# Patient Record
Sex: FEMALE | Race: WHITE | NOT HISPANIC OR LATINO | Employment: OTHER | URBAN - METROPOLITAN AREA
[De-identification: names, ages, dates, MRNs, and addresses within clinical notes are randomized per-mention and may not be internally consistent; named-entity substitution may affect disease eponyms.]

---

## 2017-05-30 ENCOUNTER — CONVERSION ENCOUNTER (OUTPATIENT)
Dept: MAMMOGRAPHY | Facility: HOSPITAL | Age: 62
End: 2017-05-30

## 2018-06-27 ENCOUNTER — CONVERSION ENCOUNTER (OUTPATIENT)
Dept: MAMMOGRAPHY | Facility: HOSPITAL | Age: 63
End: 2018-06-27

## 2018-08-09 ENCOUNTER — CONVERSION ENCOUNTER (OUTPATIENT)
Dept: MAMMOGRAPHY | Facility: HOSPITAL | Age: 63
End: 2018-08-09

## 2018-08-15 ENCOUNTER — OFFICE VISIT CONVERTED (OUTPATIENT)
Dept: GASTROENTEROLOGY | Facility: CLINIC | Age: 63
End: 2018-08-15
Attending: NURSE PRACTITIONER

## 2019-02-18 ENCOUNTER — CONVERSION ENCOUNTER (OUTPATIENT)
Dept: GASTROENTEROLOGY | Facility: CLINIC | Age: 64
End: 2019-02-18

## 2019-02-18 ENCOUNTER — OFFICE VISIT CONVERTED (OUTPATIENT)
Dept: GASTROENTEROLOGY | Facility: CLINIC | Age: 64
End: 2019-02-18
Attending: NURSE PRACTITIONER

## 2019-02-25 ENCOUNTER — HOSPITAL ENCOUNTER (OUTPATIENT)
Dept: LAB | Facility: HOSPITAL | Age: 64
Discharge: HOME | End: 2019-02-25
Attending: NURSE PRACTITIONER

## 2019-02-25 ENCOUNTER — HOSPITAL ENCOUNTER (OUTPATIENT)
Dept: GENERAL RADIOLOGY | Facility: HOSPITAL | Age: 64
Discharge: HOME | End: 2019-02-25
Attending: NURSE PRACTITIONER

## 2019-02-25 LAB
BASOPHILS # BLD AUTO: 0.03 10*3/UL (ref 0–0.2)
BASOPHILS NFR BLD AUTO: 0.6 % (ref 0–3)
CONV ABS IMM GRAN: 0 10*3/UL (ref 0–0.2)
CONV IMMATURE GRAN: 0 % (ref 0–1.8)
DEPRECATED RDW RBC AUTO: 49 FL (ref 36.4–46.3)
EOSINOPHIL # BLD AUTO: 0.13 10*3/UL (ref 0–0.7)
EOSINOPHIL # BLD AUTO: 2.5 % (ref 0–7)
ERYTHROCYTE [DISTWIDTH] IN BLOOD BY AUTOMATED COUNT: 15.2 % (ref 11.7–14.4)
FERRITIN SERPL-MCNC: 15 NG/ML (ref 10–200)
HBA1C MFR BLD: 12.2 G/DL (ref 12–16)
HCT VFR BLD AUTO: 40.1 % (ref 37–47)
INR PPP: 0.96 (ref 2–3)
LYMPHOCYTES # BLD AUTO: 2.04 10*3/UL (ref 1–5)
MCH RBC QN AUTO: 26.6 PG (ref 27–31)
MCHC RBC AUTO-ENTMCNC: 30.4 G/DL (ref 33–37)
MCV RBC AUTO: 87.6 FL (ref 81–99)
MONOCYTES # BLD AUTO: 0.47 10*3/UL (ref 0.2–1.2)
MONOCYTES NFR BLD AUTO: 9.1 % (ref 3–10)
NEUTROPHILS # BLD AUTO: 2.51 10*3/UL (ref 2–8)
NEUTROPHILS NFR BLD AUTO: 48.4 % (ref 30–85)
NRBC CBCN: 0 % (ref 0–0.7)
PLATELET # BLD AUTO: 320 10*3/UL (ref 130–400)
PMV BLD AUTO: 9.9 FL (ref 9.4–12.3)
PROTHROMBIN TIME: 10.1 S (ref 9.4–12)
RBC # BLD AUTO: 4.58 10*6/UL (ref 4.2–5.4)
VARIANT LYMPHS NFR BLD MANUAL: 39.4 % (ref 20–45)
WBC # BLD AUTO: 5.18 10*3/UL (ref 4.8–10.8)

## 2019-08-10 ENCOUNTER — HOSPITAL ENCOUNTER (OUTPATIENT)
Dept: MAMMOGRAPHY | Facility: HOSPITAL | Age: 64
Discharge: HOME | End: 2019-08-10
Attending: INTERNAL MEDICINE

## 2019-08-19 ENCOUNTER — OFFICE VISIT CONVERTED (OUTPATIENT)
Dept: GASTROENTEROLOGY | Facility: CLINIC | Age: 64
End: 2019-08-19
Attending: NURSE PRACTITIONER

## 2019-08-26 ENCOUNTER — HOSPITAL ENCOUNTER (OUTPATIENT)
Dept: GENERAL RADIOLOGY | Facility: HOSPITAL | Age: 64
Discharge: HOME | End: 2019-08-26
Attending: NURSE PRACTITIONER

## 2019-08-26 ENCOUNTER — HOSPITAL ENCOUNTER (OUTPATIENT)
Dept: LAB | Facility: HOSPITAL | Age: 64
Discharge: HOME | End: 2019-08-26
Attending: NURSE PRACTITIONER

## 2019-08-26 LAB
BASOPHILS # BLD AUTO: 0.02 10*3/UL (ref 0–0.2)
BASOPHILS NFR BLD AUTO: 0.4 % (ref 0–3)
CONV ABS IMM GRAN: 0.01 10*3/UL (ref 0–0.2)
CONV IMMATURE GRAN: 0.2 % (ref 0–1.8)
DEPRECATED RDW RBC AUTO: 43.8 FL (ref 36.4–46.3)
EOSINOPHIL # BLD AUTO: 0.12 10*3/UL (ref 0–0.7)
EOSINOPHIL # BLD AUTO: 2.5 % (ref 0–7)
ERYTHROCYTE [DISTWIDTH] IN BLOOD BY AUTOMATED COUNT: 13.5 % (ref 11.7–14.4)
FERRITIN SERPL-MCNC: 15 NG/ML (ref 10–200)
HCT VFR BLD AUTO: 38.6 % (ref 37–47)
HGB BLD-MCNC: 12.4 G/DL (ref 12–16)
IRON SATN MFR SERPL: 16 % (ref 20–55)
IRON SERPL-MCNC: 66 UG/DL (ref 60–170)
LYMPHOCYTES # BLD AUTO: 1.59 10*3/UL (ref 1–5)
LYMPHOCYTES NFR BLD AUTO: 33.4 % (ref 20–45)
MCH RBC QN AUTO: 28.4 PG (ref 27–31)
MCHC RBC AUTO-ENTMCNC: 32.1 G/DL (ref 33–37)
MCV RBC AUTO: 88.5 FL (ref 81–99)
MONOCYTES # BLD AUTO: 0.48 10*3/UL (ref 0.2–1.2)
MONOCYTES NFR BLD AUTO: 10.1 % (ref 3–10)
NEUTROPHILS # BLD AUTO: 2.54 10*3/UL (ref 2–8)
NEUTROPHILS NFR BLD AUTO: 53.4 % (ref 30–85)
NRBC CBCN: 0 % (ref 0–0.7)
PLATELET # BLD AUTO: 293 10*3/UL (ref 130–400)
PMV BLD AUTO: 9.9 FL (ref 9.4–12.3)
RBC # BLD AUTO: 4.36 10*6/UL (ref 4.2–5.4)
TIBC SERPL-MCNC: 415 UG/DL (ref 245–450)
TRANSFERRIN SERPL-MCNC: 290 MG/DL (ref 250–380)
WBC # BLD AUTO: 4.76 10*3/UL (ref 4.8–10.8)

## 2019-10-31 ENCOUNTER — HOSPITAL ENCOUNTER (OUTPATIENT)
Dept: GASTROENTEROLOGY | Facility: HOSPITAL | Age: 64
Setting detail: HOSPITAL OUTPATIENT SURGERY
Discharge: HOME | End: 2019-10-31
Attending: INTERNAL MEDICINE

## 2019-10-31 LAB — GLUCOSE BLD-MCNC: 124 MG/DL (ref 65–99)

## 2020-07-24 ENCOUNTER — HOSPITAL ENCOUNTER (OUTPATIENT)
Dept: PREADMISSION TESTING | Facility: HOSPITAL | Age: 65
Discharge: HOME | End: 2020-07-24
Attending: INTERNAL MEDICINE

## 2020-07-26 LAB — SARS-COV-2 RNA SPEC QL NAA+PROBE: NOT DETECTED

## 2020-07-28 ENCOUNTER — HOSPITAL ENCOUNTER (OUTPATIENT)
Dept: GASTROENTEROLOGY | Facility: HOSPITAL | Age: 65
Setting detail: HOSPITAL OUTPATIENT SURGERY
Discharge: HOME | End: 2020-07-28
Attending: INTERNAL MEDICINE

## 2020-07-28 LAB — GLUCOSE BLD-MCNC: 181 MG/DL (ref 65–99)

## 2020-09-23 ENCOUNTER — TELEPHONE CONVERTED (OUTPATIENT)
Dept: GASTROENTEROLOGY | Facility: CLINIC | Age: 65
End: 2020-09-23
Attending: NURSE PRACTITIONER

## 2020-10-08 ENCOUNTER — HOSPITAL ENCOUNTER (OUTPATIENT)
Dept: GENERAL RADIOLOGY | Facility: HOSPITAL | Age: 65
Discharge: HOME | End: 2020-10-08
Attending: NURSE PRACTITIONER

## 2020-10-08 ENCOUNTER — HOSPITAL ENCOUNTER (OUTPATIENT)
Dept: LAB | Facility: HOSPITAL | Age: 65
Discharge: HOME | End: 2020-10-08
Attending: NURSE PRACTITIONER

## 2020-10-08 LAB
ALBUMIN SERPL-MCNC: 4.5 G/DL (ref 3.5–5)
ALBUMIN/GLOB SERPL: 1.7 {RATIO} (ref 1.4–2.6)
ALP SERPL-CCNC: 46 U/L (ref 43–160)
ALT SERPL-CCNC: 35 U/L (ref 10–40)
ANION GAP SERPL CALC-SCNC: 21 MMOL/L (ref 8–19)
AST SERPL-CCNC: 33 U/L (ref 15–50)
BASOPHILS # BLD AUTO: 0.04 10*3/UL (ref 0–0.2)
BASOPHILS NFR BLD AUTO: 0.6 % (ref 0–3)
BILIRUB SERPL-MCNC: 0.6 MG/DL (ref 0.2–1.3)
BUN SERPL-MCNC: 17 MG/DL (ref 5–25)
BUN/CREAT SERPL: 16 {RATIO} (ref 6–20)
CALCIUM SERPL-MCNC: 10.1 MG/DL (ref 8.7–10.4)
CHLORIDE SERPL-SCNC: 98 MMOL/L (ref 99–111)
CONV ABS IMM GRAN: 0.03 10*3/UL (ref 0–0.2)
CONV CO2: 24 MMOL/L (ref 22–32)
CONV IMMATURE GRAN: 0.4 % (ref 0–1.8)
CONV TOTAL PROTEIN: 7.1 G/DL (ref 6.3–8.2)
CREAT UR-MCNC: 1.08 MG/DL (ref 0.5–0.9)
DEPRECATED RDW RBC AUTO: 41.1 FL (ref 36.4–46.3)
EOSINOPHIL # BLD AUTO: 0.21 10*3/UL (ref 0–0.7)
EOSINOPHIL # BLD AUTO: 3.1 % (ref 0–7)
ERYTHROCYTE [DISTWIDTH] IN BLOOD BY AUTOMATED COUNT: 12.7 % (ref 11.7–14.4)
GFR SERPLBLD BASED ON 1.73 SQ M-ARVRAT: 53 ML/MIN/{1.73_M2}
GLOBULIN UR ELPH-MCNC: 2.6 G/DL (ref 2–3.5)
GLUCOSE SERPL-MCNC: 151 MG/DL (ref 65–99)
HCT VFR BLD AUTO: 40.1 % (ref 37–47)
HGB BLD-MCNC: 13.1 G/DL (ref 12–16)
INR PPP: 0.96 (ref 2–3)
LYMPHOCYTES # BLD AUTO: 2.86 10*3/UL (ref 1–5)
LYMPHOCYTES NFR BLD AUTO: 42.8 % (ref 20–45)
MCH RBC QN AUTO: 29 PG (ref 27–31)
MCHC RBC AUTO-ENTMCNC: 32.7 G/DL (ref 33–37)
MCV RBC AUTO: 88.7 FL (ref 81–99)
MONOCYTES # BLD AUTO: 0.62 10*3/UL (ref 0.2–1.2)
MONOCYTES NFR BLD AUTO: 9.3 % (ref 3–10)
NEUTROPHILS # BLD AUTO: 2.92 10*3/UL (ref 2–8)
NEUTROPHILS NFR BLD AUTO: 43.8 % (ref 30–85)
NRBC CBCN: 0 % (ref 0–0.7)
OSMOLALITY SERPL CALC.SUM OF ELEC: 292 MOSM/KG (ref 273–304)
PLATELET # BLD AUTO: 304 10*3/UL (ref 130–400)
PMV BLD AUTO: 9.4 FL (ref 9.4–12.3)
POTASSIUM SERPL-SCNC: 4.4 MMOL/L (ref 3.5–5.3)
PROTHROMBIN TIME: 10.4 S (ref 9.4–12)
RBC # BLD AUTO: 4.52 10*6/UL (ref 4.2–5.4)
SODIUM SERPL-SCNC: 139 MMOL/L (ref 135–147)
WBC # BLD AUTO: 6.68 10*3/UL (ref 4.8–10.8)

## 2020-10-16 ENCOUNTER — HOSPITAL ENCOUNTER (OUTPATIENT)
Dept: MAMMOGRAPHY | Facility: HOSPITAL | Age: 65
Discharge: HOME | End: 2020-10-16
Attending: INTERNAL MEDICINE

## 2021-05-12 ENCOUNTER — OFFICE VISIT CONVERTED (OUTPATIENT)
Dept: PODIATRY | Facility: CLINIC | Age: 66
End: 2021-05-12
Attending: PODIATRIST

## 2021-05-16 VITALS
SYSTOLIC BLOOD PRESSURE: 154 MMHG | HEIGHT: 61 IN | DIASTOLIC BLOOD PRESSURE: 72 MMHG | HEART RATE: 70 BPM | WEIGHT: 142 LBS | BODY MASS INDEX: 26.81 KG/M2 | TEMPERATURE: 98.3 F

## 2021-05-16 VITALS
SYSTOLIC BLOOD PRESSURE: 131 MMHG | BODY MASS INDEX: 27.56 KG/M2 | WEIGHT: 146 LBS | DIASTOLIC BLOOD PRESSURE: 101 MMHG | HEART RATE: 87 BPM | HEIGHT: 61 IN

## 2021-06-05 NOTE — H&P
History and Physical      Patient Name: Debra Barnett   Patient ID: [id number]   Sex: Female   YOB: 1955    Primary Care Provider: Ivan Clemente MD   Referring Provider: Ivan Clemente MD    Visit Date: May 12, 2021    Provider: Jenaro Cowan DPM   Location: [facility]   Location Address: [address] [place] [address]  [id number]   Location Phone: [phone]          Chief Complaint  · Diabetic Foot Evaluation      History Of Present Illness  Debra Barnett presents to the office today as a new patient for a diabetic foot evaluation. On referral from Ivan Clemente MD   Patient reports that she is a diabetic currently controlling diabetes with oral medication      New, Established, New Problem: new   Location: bilateral feet  Duration:   Onset: gradual  Nature: NIDDM  Stable, worsening, improving: stable  Aggravating factors:  Previous Treatment: oral medication    Patient states they are unsure of the most recent blood glucose reading.    Patient denies any fevers, chills, nausea, vomiting, shortness of breathe, nor any other constitutional signs nor symptoms.       Past Medical History  Anemia; Berman's esophagus with low grade dysplasia; Corn/Callus; DM (diabetes mellitus); Fatty liver disease, nonalcoholic; GERD (gastroesophageal reflux disease); HBP (high blood pressure); Heartburn; Hepatitis A; HLD (hyperlipidemia); Murmur, cardiac; Renal calculus or stone         Past Surgical History  Appendectomy; Cholecstectomy; Colonoscopy; EGD; Foot surgery; Hysterectomy; Tubal ligation         Medication List  Aspir-81 81 mg oral tablet,delayed release (DR/EC); Calcium 600 + D(3) 600 mg(1,500mg) -200 unit oral tablet; clonidine HCl 0.2 mg oral tablet; Fish Oil Concentrate 1,000 mg oral capsule; hydroxyzine pamoate 25 mg oral capsule; metformin 1,000 mg oral tablet; metoprolol tartrate 100 mg oral tablet; Norvasc 5 mg oral tablet; omeprazole 40 mg oral capsule,delayed  "release(DR/EC); simvastatin 20 mg oral tablet         Allergy List  Neurontin       Allergies Reconciled  Family Medical History  Breast Neoplasm, Malignant; Heart Attack (MI); Lung cancer; Renal Calculus; Diabetes; FH: heart disease; No family history of colorectal cancer         Social History  Alcohol (Light); Homemaker; ; Tobacco (Former)         Review of Systems  · Constitutional  o Denies  o : fatigue, night sweats  · Eyes  o Denies  o : double vision, blurred vision  · HENT  o Denies  o : vertigo, recent head injury  · Cardiovascular  o Denies  o : chest pain, irregular heart beats  · Respiratory  o Denies  o : shortness of breath, productive cough  · Gastrointestinal  o Denies  o : nausea, vomiting  · Genitourinary  o Denies  o : dysuria, urinary retention  · Integument  o Denies  o : hair growth change, new skin lesions  · Neurologic  o Denies  o : altered mental status, seizures  · Musculoskeletal  o Denies  o : joint swelling, limitation of motion  · Endocrine  o Denies  o : cold intolerance, heat intolerance  · Heme-Lymph  o Denies  o : petechiae, lymph node enlargement or tenderness  · Allergic-Immunologic  o Denies  o : frequent illnesses      Vitals  Date Time BP Position Site L\R Cuff Size HR RR TEMP (F) WT  HT  BMI kg/m2 BSA m2 O2 Sat FR L/min FiO2 HC       05/12/2021 08:42 /87 Sitting    80 - R  98.2 143lbs 0oz 5'  1\" 27.02 1.67 99 %      05/12/2021 08:42 /82 Sitting                       Physical Examination  · Constitutional  o Appearance  o : awake, alert, well-developed, and well nourished   · Cardiovascular  o Peripheral Vascular System  o :   § Extremities  § : no edema noted  · Musculoskeletal  o Extremeties/Joint  o : Lower extremity muscle strength and range of motion is equal and symmetrical bilaterally. The knees are noted to be in normal alignment. Ankle alignment and range of motion is normal and foot structure is normal. Subtalar, metatarsal and " metatarsal-phalangeal joint range of motion is noted to be within normal limits. The digits of both feet are in normal alignment. The gait is normal.  · Left DM Foot Exam  o Sensation  o : Sharp/dull sensation is within normal limits. Montrose-Juliette 5.07 monofilament intact to all assessed areas.   o Visual Inspection  o : Skin is noted to have normal texture and turgor, with no excrescences noted. The toenails are noted to be without disease  o Vascular  o : palpable dorsalis pedis and posterir tibialis pulses present, normal capillary refill  · Right DM Foot Exam  o Sensation  o : Sharp/dull sensation is within normal limits. Montrose-Juliette 5.07 monofilament intact to all assessed areas.   o Visual Inspection  o : Skin is noted to have normal texture and turgor, with no excrescences noted. The toenails are noted to be without disease  o Vascular  o : palpable dorsalis pedis and posterir tibialis pulses present, normal capillary refill          Assessment  · Diabetes     250.00/E11.9      Plan  · Orders  o Diabetic Foot (Motor and Sensory) Exam Completed [facility] (, , 2028F) - - 05/12/2021  · Medications  o Medications have been Reconciled  o Transition of Care or Provider Policy  · Instructions  o I have discussed the findings of this evaluation with the patient. The discussion included a complete verbal explanation of any changes in the examination results, diagnosis, and the current treatment plan. A schedule for future care needs was explained. If any questions should arise after returning home, I have encouraged the patient to feel free to contact Dr. Cowan. The patient states understanding and agreement with this plan.   o Pt to monitor for problems and to contact Dr. Cowan for follow-up should such signs occur. Patient states understanding and agreement with this plan.   o Patient is to return in one year for their Podiatric Diabetic Evaluation.   o Diabetic foot exam performed and documented  this date, compliant with CQM required standards. Detail of findings as noted in physical exam.Lower extremity Neuro exam for diabetic patient performed and documented this date, compliant with PQRS required standards. Detail of findings as noted in physical exam.Advised patient importance of good routine lower extremity hygiene. Advised patient importance of evaluating for intact skin and pain free nail borders. Advised patient to use mirror to evaluate plantar/ soles of feet for better visualization. Advised patient monitor and phone office to be seen if any cracking to skin, open lesions, painful nail borders or if nails become elongated prior to next visit. Advised patient importance of daily cleansing of lower extremities, followed by good skin cream to maintain normal hydration of skin. Also advised patient importance of close daily monitoring of blood sugar. Advised to regulate diet and medications to maintain control of blood sugar in optimal range. Contact primary care provider if difficulties maintaining blood sugar levels.Advised Patient of presence of Diabetes Mellitus condition. Advised Patient risk of progression and worsening or improvement, then return of condition. Will monitor condition for any change in future. Treat with most appropriate treatment pending status of condition.Counseled and advised patient extensively on nature and ramifications of diabetes. Standard instructions given to patient for good diabetic foot care and maintenance. Advised importance of careful monitoring to avoid break down and complications secondary to diabetes. Advised patient importance of strict maintenance of blood sugar control. Advised patient of possible ominous results from neglect of condition,i.e.: amputation/ loss of digits, feet and legs, or even death.Patient states understands counseling, will monitor closely, continue good hygiene and routine diabetic foot care. Patient will contact office is questions or  problems.   o Electronically Identified Patient Education Materials Provided Electronically  · Disposition  o Call or Return if symptoms worsen or persist.            Electronically Signed by: Jenaro Cowan DPM -Author on May 12, 2021 08:57:05 AM

## 2021-06-15 ENCOUNTER — OFFICE VISIT (OUTPATIENT)
Dept: GASTROENTEROLOGY | Facility: CLINIC | Age: 66
End: 2021-06-15

## 2021-06-15 VITALS
TEMPERATURE: 97.8 F | WEIGHT: 143 LBS | HEART RATE: 79 BPM | BODY MASS INDEX: 23.82 KG/M2 | HEIGHT: 65 IN | SYSTOLIC BLOOD PRESSURE: 155 MMHG | DIASTOLIC BLOOD PRESSURE: 88 MMHG

## 2021-06-15 DIAGNOSIS — Z86.010 PERSONAL HISTORY OF COLONIC POLYPS: ICD-10-CM

## 2021-06-15 DIAGNOSIS — K76.0 FATTY LIVER: Primary | ICD-10-CM

## 2021-06-15 DIAGNOSIS — K22.719 BARRETT'S ESOPHAGUS WITH DYSPLASIA: ICD-10-CM

## 2021-06-15 PROBLEM — E11.9 TYPE 2 DIABETES MELLITUS WITHOUT COMPLICATION: Status: ACTIVE | Noted: 2021-06-15

## 2021-06-15 PROBLEM — K22.70 BARRETT'S ESOPHAGUS: Status: ACTIVE | Noted: 2021-06-15

## 2021-06-15 PROBLEM — N20.0 RENAL CALCULUS OR STONE: Status: ACTIVE | Noted: 2021-06-15

## 2021-06-15 PROBLEM — D64.9 ANEMIA: Status: ACTIVE | Noted: 2021-06-15

## 2021-06-15 PROBLEM — Z79.84 LONG TERM (CURRENT) USE OF ORAL HYPOGLYCEMIC DRUGS: Status: ACTIVE | Noted: 2021-06-15

## 2021-06-15 PROBLEM — N95.9 MENOPAUSAL AND POSTMENOPAUSAL DISORDER: Status: ACTIVE | Noted: 2021-06-15

## 2021-06-15 PROBLEM — I10 ESSENTIAL HYPERTENSION: Status: ACTIVE | Noted: 2021-06-15

## 2021-06-15 PROBLEM — R01.1 MURMUR, CARDIAC: Status: ACTIVE | Noted: 2021-06-15

## 2021-06-15 PROBLEM — E78.5 HLD (HYPERLIPIDEMIA): Status: ACTIVE | Noted: 2021-06-15

## 2021-06-15 PROBLEM — F41.1 GENERALIZED ANXIETY DISORDER: Status: ACTIVE | Noted: 2021-06-15

## 2021-06-15 PROBLEM — M54.50 LOW BACK PAIN: Status: ACTIVE | Noted: 2021-06-15

## 2021-06-15 PROBLEM — E11.9 DM (DIABETES MELLITUS) (HCC): Status: ACTIVE | Noted: 2021-06-15

## 2021-06-15 PROBLEM — E78.2 MIXED HYPERLIPIDEMIA: Status: ACTIVE | Noted: 2021-06-15

## 2021-06-15 PROBLEM — B15.9 HEPATITIS A: Status: ACTIVE | Noted: 2021-06-15

## 2021-06-15 PROCEDURE — 99214 OFFICE O/P EST MOD 30 MIN: CPT | Performed by: NURSE PRACTITIONER

## 2021-06-15 RX ORDER — METOPROLOL TARTRATE 100 MG/1
TABLET ORAL
COMMUNITY
Start: 2021-04-23

## 2021-06-15 RX ORDER — ASPIRIN 81 MG/1
TABLET ORAL
COMMUNITY

## 2021-06-15 RX ORDER — AMLODIPINE BESYLATE 5 MG/1
TABLET ORAL
COMMUNITY
Start: 2021-04-23

## 2021-06-15 RX ORDER — SIMVASTATIN 20 MG
TABLET ORAL
COMMUNITY
Start: 2021-04-23

## 2021-06-15 RX ORDER — HYDROXYZINE PAMOATE 25 MG/1
CAPSULE ORAL
COMMUNITY
Start: 2021-04-23

## 2021-06-15 RX ORDER — OMEPRAZOLE 40 MG/1
CAPSULE, DELAYED RELEASE ORAL
COMMUNITY
Start: 2021-04-23 | End: 2023-02-14

## 2021-06-15 RX ORDER — CLONIDINE HYDROCHLORIDE 0.2 MG/1
TABLET ORAL
COMMUNITY
Start: 2021-04-23

## 2021-06-15 NOTE — PROGRESS NOTES
Patient Name: Debra Barnett   Visit Date: 06/15/2021   Patient ID: [id number]  Provider: BRIA Lee    Sex: female  Location:  Location Address:  Location Phone: [phone]  [place] [address] [phone]    YOB: 1955      Primary Care Provider Ivan Clemente MD      Referring Provider: No ref. provider found        Chief Complaint  6 month F/U    History of Present Illness   Medical History/ Overview of Patient Symptoms           Patient initially presented May 2003 with history of GERD requiring PPI therapy and hx of Berman's. Patient had EGD with RFA for Berman's with Dr Kasper x 3 in 2017, after EGD w Dr Carpio showed low-grade dysplasia in 9/2016. She cont to follow w Dr Kasper for EGDs. Last EGD 4/2018 w DR. Kasper--indef. for dysplasia.  Fatty liver noted in 2012, liver biopsy in 2014+ for SINGH (neg. for fibrosis/cirrhosis), serum hepatic workup negative 04/20/2014.    Pt has had hx of anemia.  PCP notified pt anemic again 8/2018.   Capsule endoscopy 12/9/17 NEGATIVE except for gastritis.    Colonoscopy 7/28/2020: Adequate prep, diverticula seen in the sigmoid, 4 mm polyp in the ascending colon completely removed--adenoma, 2 to 3 cm submucosal soft lesion    Patient was last seen September 2020 with no GI complaints.     RUQ US 10/8/2020: Fatty liver, previous cholecystectomy  10/8/2020: CBC unremarkable, INR 0.96, GFR 53 otherwise CMP unremarkable    Today pt states she's feeling well. Still w trouble losing weight, but tries to follow a low carb diet.   No c/o w BM, no abd pain, no HB now, states PCP increased PCP to BID dosing and this is working well. I advised pt she needs to get in contact w Dr Kasper for repeat EGD.   Past Medical History:   Diagnosis Date   • Anemia    • Berman's esophagus with low grade dysplasia    • Cardiac murmur    • Corns and callus    • DM (diabetes mellitus) (CMS/HCC)    • Fatty liver disease, nonalcoholic    • GERD  "(gastroesophageal reflux disease)    • HBP (high blood pressure)    • Heartburn    • Hepatitis A 1971   • Hyperlipidemia    • Renal stone        Past Surgical History:   Procedure Laterality Date   • APPENDECTOMY     • CHOLECYSTECTOMY     • COLONOSCOPY  08/11/2016    DR MORAN   • ENDOSCOPY  09/15/2016    DR MORAN   • FOOT SURGERY     • HYSTERECTOMY     • LAPAROSCOPIC TUBAL LIGATION           Current Outpatient Medications:   •  amLODIPine (NORVASC) 5 MG tablet, , Disp: , Rfl:   •  aspirin (aspirin) 81 MG EC tablet, Aspir-81 81 mg oral tablet,delayed release (DR/EC) take 1 tablet (81 mg) by oral route once daily   Active, Disp: , Rfl:   •  cloNIDine (CATAPRES) 0.2 MG tablet, , Disp: , Rfl:   •  Garlic-DHA-EPA (GARLIC/EPA PO), as directed, Disp: , Rfl:   •  hydrOXYzine pamoate (VISTARIL) 25 MG capsule, , Disp: , Rfl:   •  metFORMIN (GLUCOPHAGE) 1000 MG tablet, , Disp: , Rfl:   •  metoprolol tartrate (LOPRESSOR) 100 MG tablet, , Disp: , Rfl:   •  omeprazole (priLOSEC) 40 MG capsule, , Disp: , Rfl:   •  simvastatin (ZOCOR) 20 MG tablet, , Disp: , Rfl:      Allergies   Allergen Reactions   • Gabapentin Unknown - High Severity       Family History   Problem Relation Age of Onset   • Breast cancer Mother 60   • Diabetes Mother    • Heart disease Mother    • Heart attack Father    • Heart disease Father    • Urolithiasis Sister    • Lung cancer Brother 60   • Colon cancer Neg Hx         Social History     Tobacco Use   • Smoking status: Former Smoker   • Smokeless tobacco: Never Used   Vaping Use   • Vaping Use: Never used   Substance Use Topics   • Alcohol use: Yes     Comment: RARELY   • Drug use: Not on file       Objective     Vital Signs:   /88 (BP Location: Right arm, Patient Position: Sitting, Cuff Size: Small Adult)   Pulse 79   Temp 97.8 °F (36.6 °C)   Ht 163.8 cm (64.5\")   Wt 64.9 kg (143 lb)   BMI 24.17 kg/m²       Physical Exam  Constitutional:       General: He is not in acute distress.     " Appearance: Normal appearance.   HENT:      Head: Normocephalic and atraumatic.      Nose: Nose normal.   Pulmonary:      Effort: Pulmonary effort is normal. No respiratory distress.   Abdominal:      General: Abdomen is flat.      Palpations: Abdomen is soft. There is no mass.      Tenderness: There is no abdominal tenderness. There is no guarding.   Musculoskeletal:      Cervical back: Neck supple.      Right lower leg: No edema.      Left lower leg: No edema.   Skin:     General: Skin is warm and dry.   Neurological:      General: No focal deficit present.      Mental Status: He is alert and oriented to person, place, and time.      Gait: Gait normal.   Psychiatric:         Mood and Affect: Mood normal.         Speech: Speech normal.         Behavior: Behavior normal.         Thought Content: Thought content normal.     Result Review :               Assessment and Plan    Diagnoses and all orders for this visit:    1. Fatty liver (Primary)  -     CBC (No Diff); Future  -     Hepatic Function Panel; Future  -     Protime-INR; Future  -     US Liver; Future    2. Berman's esophagus with dysplasia          Follow Up   -I recommended pt call Dr Kasper to check on EGD recall. Pt given phone #.  -Cont w low carb diet, 2-4 c. /day of coffee  -F/u 6 months  -Call or return to clinic PRN if any change in bowel pattern, blood in stool, or new GI sx.   -Patient was given instructions and counseling regarding her condition or for health maintenance advice. Please see specific information pulled into the AVS if appropriate.

## 2021-07-15 ENCOUNTER — APPOINTMENT (OUTPATIENT)
Dept: ULTRASOUND IMAGING | Facility: HOSPITAL | Age: 66
End: 2021-07-15

## 2021-07-15 VITALS
DIASTOLIC BLOOD PRESSURE: 87 MMHG | SYSTOLIC BLOOD PRESSURE: 152 MMHG | OXYGEN SATURATION: 99 % | HEART RATE: 80 BPM | TEMPERATURE: 98.2 F | HEIGHT: 61 IN | WEIGHT: 143 LBS | BODY MASS INDEX: 27 KG/M2

## 2021-07-21 ENCOUNTER — TRANSCRIBE ORDERS (OUTPATIENT)
Dept: ADMINISTRATIVE | Facility: HOSPITAL | Age: 66
End: 2021-07-21

## 2021-07-21 DIAGNOSIS — Z12.31 SCREENING MAMMOGRAM FOR HIGH-RISK PATIENT: Primary | ICD-10-CM

## 2021-07-29 ENCOUNTER — HOSPITAL ENCOUNTER (OUTPATIENT)
Dept: ULTRASOUND IMAGING | Facility: HOSPITAL | Age: 66
Discharge: HOME | End: 2021-07-29

## 2021-07-29 ENCOUNTER — LAB (OUTPATIENT)
Dept: LAB | Facility: HOSPITAL | Age: 66
End: 2021-07-29

## 2021-07-29 DIAGNOSIS — K76.0 FATTY LIVER: ICD-10-CM

## 2021-07-29 LAB
ALBUMIN SERPL-MCNC: 4.5 G/DL (ref 3.5–5.2)
ALP SERPL-CCNC: 50 U/L (ref 39–117)
ALT SERPL W P-5'-P-CCNC: 31 U/L (ref 1–33)
AST SERPL-CCNC: 25 U/L (ref 1–32)
BILIRUB CONJ SERPL-MCNC: <0.2 MG/DL (ref 0–0.3)
BILIRUB INDIRECT SERPL-MCNC: NORMAL MG/DL
BILIRUB SERPL-MCNC: 0.5 MG/DL (ref 0–1.2)
DEPRECATED RDW RBC AUTO: 42 FL (ref 37–54)
ERYTHROCYTE [DISTWIDTH] IN BLOOD BY AUTOMATED COUNT: 12.9 % (ref 12.3–15.4)
HCT VFR BLD AUTO: 41.4 % (ref 34–46.6)
HGB BLD-MCNC: 13.4 G/DL (ref 12–15.9)
INR PPP: 0.9 (ref 2–3)
MCH RBC QN AUTO: 29.1 PG (ref 26.6–33)
MCHC RBC AUTO-ENTMCNC: 32.4 G/DL (ref 31.5–35.7)
MCV RBC AUTO: 89.8 FL (ref 79–97)
PLATELET # BLD AUTO: 343 10*3/MM3 (ref 140–450)
PMV BLD AUTO: 9.4 FL (ref 6–12)
PROT SERPL-MCNC: 7.3 G/DL (ref 6–8.5)
PROTHROMBIN TIME: 10 SECONDS (ref 9.4–12)
RBC # BLD AUTO: 4.61 10*6/MM3 (ref 3.77–5.28)
WBC # BLD AUTO: 6.36 10*3/MM3 (ref 3.4–10.8)

## 2021-07-29 PROCEDURE — 85610 PROTHROMBIN TIME: CPT

## 2021-07-29 PROCEDURE — 85027 COMPLETE CBC AUTOMATED: CPT

## 2021-07-29 PROCEDURE — 36415 COLL VENOUS BLD VENIPUNCTURE: CPT

## 2021-07-29 PROCEDURE — 80076 HEPATIC FUNCTION PANEL: CPT

## 2021-07-29 PROCEDURE — 76705 ECHO EXAM OF ABDOMEN: CPT

## 2021-10-14 ENCOUNTER — TRANSCRIBE ORDERS (OUTPATIENT)
Dept: LAB | Facility: HOSPITAL | Age: 66
End: 2021-10-14

## 2021-10-14 DIAGNOSIS — Z01.818 PRE-OP TESTING: Primary | ICD-10-CM

## 2021-10-19 ENCOUNTER — HOSPITAL ENCOUNTER (OUTPATIENT)
Dept: MAMMOGRAPHY | Facility: HOSPITAL | Age: 66
Discharge: HOME | End: 2021-10-19
Admitting: INTERNAL MEDICINE

## 2021-10-19 DIAGNOSIS — Z12.31 SCREENING MAMMOGRAM FOR HIGH-RISK PATIENT: ICD-10-CM

## 2021-10-19 PROCEDURE — 77067 SCR MAMMO BI INCL CAD: CPT

## 2021-10-19 PROCEDURE — 77063 BREAST TOMOSYNTHESIS BI: CPT

## 2021-10-24 ENCOUNTER — HOSPITAL ENCOUNTER (EMERGENCY)
Facility: HOSPITAL | Age: 66
Discharge: HOME | End: 2021-10-24
Attending: EMERGENCY MEDICINE | Admitting: EMERGENCY MEDICINE

## 2021-10-24 VITALS
BODY MASS INDEX: 26.64 KG/M2 | RESPIRATION RATE: 16 BRPM | OXYGEN SATURATION: 100 % | TEMPERATURE: 97.6 F | DIASTOLIC BLOOD PRESSURE: 82 MMHG | SYSTOLIC BLOOD PRESSURE: 155 MMHG | WEIGHT: 141.09 LBS | HEIGHT: 61 IN | HEART RATE: 77 BPM

## 2021-10-24 DIAGNOSIS — E87.1 HYPONATREMIA: Primary | ICD-10-CM

## 2021-10-24 DIAGNOSIS — R30.0 DYSURIA: ICD-10-CM

## 2021-10-24 DIAGNOSIS — I10 HYPERTENSION, UNSPECIFIED TYPE: ICD-10-CM

## 2021-10-24 LAB
ALBUMIN SERPL-MCNC: 4.4 G/DL (ref 3.5–5.2)
ALBUMIN/GLOB SERPL: 1.8 G/DL
ALP SERPL-CCNC: 48 U/L (ref 39–117)
ALT SERPL W P-5'-P-CCNC: 36 U/L (ref 1–33)
ANION GAP SERPL CALCULATED.3IONS-SCNC: 14.4 MMOL/L (ref 5–15)
AST SERPL-CCNC: 26 U/L (ref 1–32)
BASOPHILS # BLD AUTO: 0.03 10*3/MM3 (ref 0–0.2)
BASOPHILS NFR BLD AUTO: 0.5 % (ref 0–1.5)
BILIRUB SERPL-MCNC: 0.5 MG/DL (ref 0–1.2)
BILIRUB UR QL STRIP: NEGATIVE
BUN SERPL-MCNC: 9 MG/DL (ref 8–23)
BUN/CREAT SERPL: 11.4 (ref 7–25)
CALCIUM SPEC-SCNC: 8.9 MG/DL (ref 8.6–10.5)
CANDIDA SPECIES: NEGATIVE
CHLORIDE SERPL-SCNC: 90 MMOL/L (ref 98–107)
CLARITY UR: CLEAR
CO2 SERPL-SCNC: 23.6 MMOL/L (ref 22–29)
COLOR UR: YELLOW
CREAT SERPL-MCNC: 0.79 MG/DL (ref 0.57–1)
DEPRECATED RDW RBC AUTO: 38.6 FL (ref 37–54)
EOSINOPHIL # BLD AUTO: 0.14 10*3/MM3 (ref 0–0.4)
EOSINOPHIL NFR BLD AUTO: 2.2 % (ref 0.3–6.2)
ERYTHROCYTE [DISTWIDTH] IN BLOOD BY AUTOMATED COUNT: 12.3 % (ref 12.3–15.4)
GARDNERELLA VAGINALIS: NEGATIVE
GFR SERPL CREATININE-BSD FRML MDRD: 72 ML/MIN/1.73
GLOBULIN UR ELPH-MCNC: 2.5 GM/DL
GLUCOSE SERPL-MCNC: 131 MG/DL (ref 65–99)
GLUCOSE UR STRIP-MCNC: NEGATIVE MG/DL
HCT VFR BLD AUTO: 36 % (ref 34–46.6)
HGB BLD-MCNC: 12.5 G/DL (ref 12–15.9)
HGB UR QL STRIP.AUTO: NEGATIVE
IMM GRANULOCYTES # BLD AUTO: 0.01 10*3/MM3 (ref 0–0.05)
IMM GRANULOCYTES NFR BLD AUTO: 0.2 % (ref 0–0.5)
KETONES UR QL STRIP: NEGATIVE
LEUKOCYTE ESTERASE UR QL STRIP.AUTO: NEGATIVE
LYMPHOCYTES # BLD AUTO: 2.64 10*3/MM3 (ref 0.7–3.1)
LYMPHOCYTES NFR BLD AUTO: 40.7 % (ref 19.6–45.3)
MCH RBC QN AUTO: 29.8 PG (ref 26.6–33)
MCHC RBC AUTO-ENTMCNC: 34.7 G/DL (ref 31.5–35.7)
MCV RBC AUTO: 85.9 FL (ref 79–97)
MONOCYTES # BLD AUTO: 0.63 10*3/MM3 (ref 0.1–0.9)
MONOCYTES NFR BLD AUTO: 9.7 % (ref 5–12)
NEUTROPHILS NFR BLD AUTO: 3.04 10*3/MM3 (ref 1.7–7)
NEUTROPHILS NFR BLD AUTO: 46.7 % (ref 42.7–76)
NITRITE UR QL STRIP: NEGATIVE
NRBC BLD AUTO-RTO: 0 /100 WBC (ref 0–0.2)
PH UR STRIP.AUTO: 6.5 [PH] (ref 5–8)
PLATELET # BLD AUTO: 256 10*3/MM3 (ref 140–450)
PMV BLD AUTO: 8.8 FL (ref 6–12)
POTASSIUM SERPL-SCNC: 3.9 MMOL/L (ref 3.5–5.2)
PROT SERPL-MCNC: 6.9 G/DL (ref 6–8.5)
PROT UR QL STRIP: ABNORMAL
RBC # BLD AUTO: 4.19 10*6/MM3 (ref 3.77–5.28)
SODIUM SERPL-SCNC: 128 MMOL/L (ref 136–145)
SP GR UR STRIP: 1.01 (ref 1–1.03)
T VAGINALIS DNA VAG QL PROBE+SIG AMP: NEGATIVE
UROBILINOGEN UR QL STRIP: ABNORMAL
WBC # BLD AUTO: 6.49 10*3/MM3 (ref 3.4–10.8)

## 2021-10-24 PROCEDURE — 25010000002 ONDANSETRON PER 1 MG: Performed by: REGISTERED NURSE

## 2021-10-24 PROCEDURE — 85025 COMPLETE CBC W/AUTO DIFF WBC: CPT | Performed by: REGISTERED NURSE

## 2021-10-24 PROCEDURE — 87660 TRICHOMONAS VAGIN DIR PROBE: CPT | Performed by: REGISTERED NURSE

## 2021-10-24 PROCEDURE — 96374 THER/PROPH/DIAG INJ IV PUSH: CPT

## 2021-10-24 PROCEDURE — 80053 COMPREHEN METABOLIC PANEL: CPT | Performed by: REGISTERED NURSE

## 2021-10-24 PROCEDURE — 99283 EMERGENCY DEPT VISIT LOW MDM: CPT

## 2021-10-24 PROCEDURE — 81003 URINALYSIS AUTO W/O SCOPE: CPT | Performed by: EMERGENCY MEDICINE

## 2021-10-24 PROCEDURE — 87510 GARDNER VAG DNA DIR PROBE: CPT | Performed by: REGISTERED NURSE

## 2021-10-24 PROCEDURE — 87480 CANDIDA DNA DIR PROBE: CPT | Performed by: REGISTERED NURSE

## 2021-10-24 RX ORDER — ONDANSETRON 2 MG/ML
4 INJECTION INTRAMUSCULAR; INTRAVENOUS ONCE
Status: COMPLETED | OUTPATIENT
Start: 2021-10-24 | End: 2021-10-24

## 2021-10-24 RX ADMIN — SODIUM CHLORIDE 1000 ML: 9 INJECTION, SOLUTION INTRAVENOUS at 17:06

## 2021-10-24 RX ADMIN — ONDANSETRON 4 MG: 2 INJECTION INTRAMUSCULAR; INTRAVENOUS at 17:05

## 2021-10-24 NOTE — DISCHARGE INSTRUCTIONS
Please schedule follow-up appointment with your primary care provider in 2 days to have your sodium level rechecked.    You very symptoms should improve over the next few days as irritation resolves.  If today's test was abnormal somebody will call you with the result.    Check your blood pressure to ensure that it returns to your baseline as your pain improves.

## 2021-10-24 NOTE — ED PROVIDER NOTES
Time: 4:01 PM EDT  Arrived by: JOSE  Chief Complaint: Painful urination, nausea  History provided by: Patient      History of Present Illness:  Patient is a 66 y.o. year old female that presents to the emergency department with complaint of painful urination, back pain and nausea that started 3 days ago.  She admits feeling flushed with chills.  She denies frequent UTI.       used: No    Urinary Tract Infection  Pain quality:  Burning  Pain severity:  Mild  Onset quality:  Sudden  Duration:  3 days  Timing:  Constant  Progression:  Worsening  Chronicity:  New  Recent urinary tract infections: no    Relieved by:  Nothing  Ineffective treatments:  None tried  Urinary symptoms: hesitancy    Urinary symptoms: no hematuria    Associated symptoms: abdominal pain, fever and nausea    Associated symptoms: no flank pain    Risk factors: no hx of pyelonephritis and no recurrent urinary tract infections    Nausea  The primary symptoms include fever, abdominal pain, nausea and dysuria.   The dysuria is not associated with hematuria.   The illness is also significant for back pain.   Back Pain  Associated symptoms: abdominal pain, dysuria and fever            Similar Symptoms Previously: Yes, many years ago  Recently seen: No      Patient Care Team  Primary Care Provider: Dr. VANESSA Clemente    Past Medical History:     Allergies   Allergen Reactions   • Gabapentin Unknown - High Severity     Past Medical History:   Diagnosis Date   • Anemia    • Berman's esophagus with low grade dysplasia    • Cardiac murmur    • Corns and callus    • DM (diabetes mellitus) (HCC)    • Fatty liver disease, nonalcoholic    • GERD (gastroesophageal reflux disease)    • HBP (high blood pressure)    • Heartburn    • Hepatitis A 1971   • Hyperlipidemia    • Renal stone      Past Surgical History:   Procedure Laterality Date   • APPENDECTOMY     • CHOLECYSTECTOMY     • COLONOSCOPY  08/11/2016    DR MORAN   • ENDOSCOPY  09/15/2016      "LUISA   • FOOT SURGERY     • HYSTERECTOMY     • LAPAROSCOPIC TUBAL LIGATION       Family History   Problem Relation Age of Onset   • Breast cancer Mother 60   • Diabetes Mother    • Heart disease Mother    • Heart attack Father    • Heart disease Father    • Urolithiasis Sister    • Lung cancer Brother 60   • Colon cancer Neg Hx        Home Medications:  Prior to Admission medications    Medication Sig Start Date End Date Taking? Authorizing Provider   amLODIPine (NORVASC) 5 MG tablet  4/23/21   Aleks Carlson MD   aspirin (aspirin) 81 MG EC tablet Aspir-81 81 mg oral tablet,delayed release (DR/EC) take 1 tablet (81 mg) by oral route once daily   Active    ProviderAleks MD   cloNIDine (CATAPRES) 0.2 MG tablet  4/23/21   Aleks Carlson MD   Garlic-DHA-EPA (GARLIC/EPA PO) as directed    ProviderAleks MD   hydrOXYzine pamoate (VISTARIL) 25 MG capsule  4/23/21   Aleks Carlson MD   metFORMIN (GLUCOPHAGE) 1000 MG tablet  4/23/21   Aleks Carlson MD   metoprolol tartrate (LOPRESSOR) 100 MG tablet  4/23/21   Aleks Carlson MD   omeprazole (priLOSEC) 40 MG capsule  4/23/21   Aleks Carlson MD   simvastatin (ZOCOR) 20 MG tablet  4/23/21   ProviderAleks MD        Social History:   PT  reports that she has quit smoking. She has never used smokeless tobacco. She reports current alcohol use. No history on file for drug use.    Record Review:  I have reviewed the patient's records in [employer].     Review of Systems  Review of Systems   Constitutional: Positive for fever.   Gastrointestinal: Positive for abdominal pain and nausea.   Genitourinary: Positive for decreased urine volume and dysuria. Negative for flank pain and hematuria.   Musculoskeletal: Positive for back pain.   All other systems reviewed and are negative.       Physical Exam    /82   Pulse 77   Temp 97.6 °F (36.4 °C) (Oral)   Resp 16   Ht 154.9 cm (61\")   Wt 64 kg (141 lb 1.5 oz)   SpO2 " "100%   BMI 26.66 kg/m²     Physical Exam  Vitals (Hypertensive in triage) and nursing note reviewed.   Constitutional:       General: She is not in acute distress.     Appearance: Normal appearance. She is not toxic-appearing.   HENT:      Head: Normocephalic and atraumatic.      Mouth/Throat:      Mouth: Mucous membranes are moist.   Eyes:      General: No scleral icterus.  Cardiovascular:      Rate and Rhythm: Normal rate and regular rhythm.      Pulses: Normal pulses.      Heart sounds: Normal heart sounds.   Pulmonary:      Effort: Pulmonary effort is normal. No respiratory distress.      Breath sounds: Normal breath sounds.   Abdominal:      General: Abdomen is protuberant. Bowel sounds are normal.      Palpations: Abdomen is soft.      Tenderness: There is abdominal tenderness in the suprapubic area. There is no right CVA tenderness, left CVA tenderness, guarding or rebound.   Musculoskeletal:         General: Normal range of motion.      Cervical back: Normal range of motion and neck supple.      Comments: Mild tenderness over lower back   Skin:     General: Skin is warm and dry.   Neurological:      Mental Status: She is alert and oriented to person, place, and time. Mental status is at baseline.                  ED Course  /82   Pulse 77   Temp 97.6 °F (36.4 °C) (Oral)   Resp 16   Ht 154.9 cm (61\")   Wt 64 kg (141 lb 1.5 oz)   SpO2 100%   BMI 26.66 kg/m²   Results for orders placed or performed during the hospital encounter of 10/24/21   Gardnerella vaginalis, Trichomonas vaginalis, Candida albicans, DNA - Swab, Vagina    Specimen: Vagina; Swab   Result Value Ref Range    GARDNERELLA VAGINALIS Negative Negative    TRICHOMONAS VAGINALIS Negative Negative    CANDIDA SPECIES Negative Negative   Urinalysis With Culture If Indicated - Urine, Clean Catch    Specimen: Urine, Clean Catch   Result Value Ref Range    Color, UA Yellow Yellow, Straw    Appearance, UA Clear Clear    pH, UA 6.5 5.0 - 8.0    " Specific Gravity, UA 1.006 1.005 - 1.030    Glucose, UA Negative Negative    Ketones, UA Negative Negative    Bilirubin, UA Negative Negative    Blood, UA Negative Negative    Protein, UA Trace (A) Negative    Leuk Esterase, UA Negative Negative    Nitrite, UA Negative Negative    Urobilinogen, UA 0.2 E.U./dL 0.2 - 1.0 E.U./dL   Comprehensive Metabolic Panel    Specimen: Blood   Result Value Ref Range    Glucose 131 (H) 65 - 99 mg/dL    BUN 9 8 - 23 mg/dL    Creatinine 0.79 0.57 - 1.00 mg/dL    Sodium 128 (L) 136 - 145 mmol/L    Potassium 3.9 3.5 - 5.2 mmol/L    Chloride 90 (L) 98 - 107 mmol/L    CO2 23.6 22.0 - 29.0 mmol/L    Calcium 8.9 8.6 - 10.5 mg/dL    Total Protein 6.9 6.0 - 8.5 g/dL    Albumin 4.40 3.50 - 5.20 g/dL    ALT (SGPT) 36 (H) 1 - 33 U/L    AST (SGOT) 26 1 - 32 U/L    Alkaline Phosphatase 48 39 - 117 U/L    Total Bilirubin 0.5 0.0 - 1.2 mg/dL    eGFR Non African Amer 72 >60 mL/min/1.73    Globulin 2.5 gm/dL    A/G Ratio 1.8 g/dL    BUN/Creatinine Ratio 11.4 7.0 - 25.0    Anion Gap 14.4 5.0 - 15.0 mmol/L   CBC Auto Differential    Specimen: Blood   Result Value Ref Range    WBC 6.49 3.40 - 10.80 10*3/mm3    RBC 4.19 3.77 - 5.28 10*6/mm3    Hemoglobin 12.5 12.0 - 15.9 g/dL    Hematocrit 36.0 34.0 - 46.6 %    MCV 85.9 79.0 - 97.0 fL    MCH 29.8 26.6 - 33.0 pg    MCHC 34.7 31.5 - 35.7 g/dL    RDW 12.3 12.3 - 15.4 %    RDW-SD 38.6 37.0 - 54.0 fl    MPV 8.8 6.0 - 12.0 fL    Platelets 256 140 - 450 10*3/mm3    Neutrophil % 46.7 42.7 - 76.0 %    Lymphocyte % 40.7 19.6 - 45.3 %    Monocyte % 9.7 5.0 - 12.0 %    Eosinophil % 2.2 0.3 - 6.2 %    Basophil % 0.5 0.0 - 1.5 %    Immature Grans % 0.2 0.0 - 0.5 %    Neutrophils, Absolute 3.04 1.70 - 7.00 10*3/mm3    Lymphocytes, Absolute 2.64 0.70 - 3.10 10*3/mm3    Monocytes, Absolute 0.63 0.10 - 0.90 10*3/mm3    Eosinophils, Absolute 0.14 0.00 - 0.40 10*3/mm3    Basophils, Absolute 0.03 0.00 - 0.20 10*3/mm3    Immature Grans, Absolute 0.01 0.00 - 0.05 10*3/mm3     nRBC 0.0 0.0 - 0.2 /100 WBC     Medications   sodium chloride 0.9 % bolus 1,000 mL (0 mL Intravenous Stopped 10/24/21 1912)   ondansetron (ZOFRAN) injection 4 mg (4 mg Intravenous Given 10/24/21 1705)     Mammo Screening Digital Tomosynthesis Bilateral With CAD    Result Date: 10/19/2021  Narrative: PROCEDURE: MAMMO SCREENING DIGITAL TOMOSYNTHESIS BILATERAL W CAD  COMPARISON: [facility], DIGITAL SCREENING W/CAD, 10/08/2014, 9:09.  [facility], DIGITAL DIAG UNILAT W/CAD LT, 10/17/2014, 13:38.  [facility], DIGITAL SCREENING W/CAD, 5/25/2016, 10:21.  [facility], DIGITAL SCREENING W/CAD, 5/30/2017, 8:16.  [facility], DIG SCREENING BILAT ANOOP W 3D FANY, 6/27/2018, 8:30.  [facility], DIG SCREENING BILAT ANOOP W 3D FANY, 8/10/2019, 9:13.  [facility], DIG SCREENING BILAT ANOOP W 3D FANY, 10/16/2020, 13:39.  VIEWS:  BILATERAL CC AND MLO VIEWS WERE OBTAINED UTILIZING 3D TOMOSYNTHESIS AND R2 CAD SOFTWARE  INDICATIONS: SCREENING.  Family history of breast cancer in her mother.  FINDINGS:  No suspicious mass, area of architectural distortion or suspicious microcalcification is identified.  CONCLUSION:  Benign mammogram. Suggest routine mammographic screening.  RECOMMENDATION(S):  ROUTINE MAMMOGRAM AND CLINICAL EVALUATION IN 12 MONTHS.   BIRADS:  DIAGNOSTIC CATEGORY 1--NEGATIVE.   BREAST COMPOSITION: Heterogeneously dense,which may obscure small masses.  PLEASE NOTE:  A NORMAL MAMMOGRAM DOES NOT EXCLUDE THE POSSIBILITY OF BREAST CANCER. ANY CLINICALLY SUSPICIOUS PALPABLE LUMP SHOULD BE BIOPSIED.      NEETA ADAMS MD       Electronically Signed and Approved By: NEETA ADAMS MD on 10/19/2021 at 15:44               Procedures/EKGs:  Procedures          Medical Decision Making:                     MDM  Number of Diagnoses or Management Options  Dysuria: new and requires workup  Hypertension, unspecified  type: new and requires workup  Hyponatremia: new and requires workup  Diagnosis management comments: The patient symptoms are likely related to vaginal irritation secondary to recent yeast infection.  The patient was treated for yeast infection with a 3-day course of vaginal suppository.  We collected swab today to assure resolution of yeast infection with prior treatment.  Expect patient symptoms will improve as irritation resolves.  Incidental finding of hyponatremia was discussed with the patient and patient was advised to follow-up with PCP in 2 days to have sodium level rechecked.  Regarding hypertension the patient states that her blood pressure tends to be elevated when she does not feel well.  Advised patient to monitor blood pressure to ensure return to baseline in short order.  Patient agreed with this plan.  Patient was given return precautions to include worsening pain, development of fever, or any other concerning symptoms.       Amount and/or Complexity of Data Reviewed  Clinical lab tests: reviewed and ordered  Decide to obtain previous medical records or to obtain history from someone other than the patient: yes    Risk of Complications, Morbidity, and/or Mortality  Presenting problems: low  Diagnostic procedures: low  Management options: low    Patient Progress  Patient progress: stable       Final diagnoses:   Hyponatremia   Dysuria   Hypertension, unspecified type        Disposition:  ED Disposition     ED Disposition Condition Comment    Discharge Stable            Gunjan Palmer, APRN  10/25/21 0039

## 2021-11-08 ENCOUNTER — LAB (OUTPATIENT)
Dept: LAB | Facility: HOSPITAL | Age: 66
End: 2021-11-08

## 2021-11-08 DIAGNOSIS — Z01.818 PRE-OP TESTING: ICD-10-CM

## 2021-11-08 PROCEDURE — U0004 COV-19 TEST NON-CDC HGH THRU: HCPCS

## 2021-11-08 PROCEDURE — U0005 INFEC AGEN DETEC AMPLI PROBE: HCPCS

## 2021-11-09 LAB — SARS-COV-2 RNA NOSE QL NAA+PROBE: NOT DETECTED

## 2021-12-15 ENCOUNTER — OFFICE VISIT (OUTPATIENT)
Dept: GASTROENTEROLOGY | Facility: CLINIC | Age: 66
End: 2021-12-15

## 2021-12-15 VITALS
DIASTOLIC BLOOD PRESSURE: 71 MMHG | HEART RATE: 77 BPM | SYSTOLIC BLOOD PRESSURE: 143 MMHG | BODY MASS INDEX: 25.98 KG/M2 | WEIGHT: 141.2 LBS | HEIGHT: 62 IN

## 2021-12-15 DIAGNOSIS — K75.81 NASH (NONALCOHOLIC STEATOHEPATITIS): Primary | ICD-10-CM

## 2021-12-15 DIAGNOSIS — K57.90 DIVERTICULOSIS: ICD-10-CM

## 2021-12-15 DIAGNOSIS — K22.710 BARRETT'S ESOPHAGUS WITH LOW GRADE DYSPLASIA: ICD-10-CM

## 2021-12-15 PROBLEM — K21.9 GASTROESOPHAGEAL REFLUX DISEASE: Status: ACTIVE | Noted: 2021-06-15

## 2021-12-15 PROBLEM — K44.9 HIATAL HERNIA: Status: ACTIVE | Noted: 2021-12-10

## 2021-12-15 PROBLEM — G51.0 BELL'S PALSY: Status: ACTIVE | Noted: 2021-12-15

## 2021-12-15 PROBLEM — Z86.39 H/O HYPERLIPIDEMIA: Status: ACTIVE | Noted: 2021-12-15

## 2021-12-15 PROBLEM — Z86.59 H/O: DEPRESSION: Status: ACTIVE | Noted: 2021-12-15

## 2021-12-15 PROBLEM — F41.9 ANXIETY: Status: ACTIVE | Noted: 2021-12-15

## 2021-12-15 PROBLEM — Z28.21 IMMUNIZATION REFUSED: Status: ACTIVE | Noted: 2021-12-15

## 2021-12-15 PROCEDURE — 99214 OFFICE O/P EST MOD 30 MIN: CPT | Performed by: NURSE PRACTITIONER

## 2021-12-15 RX ORDER — ASCORBIC ACID 500 MG
500 TABLET ORAL
COMMUNITY
Start: 2021-07-14

## 2021-12-15 RX ORDER — ZINC GLUCONATE 50 MG
TABLET ORAL
COMMUNITY

## 2021-12-15 RX ORDER — VITAMIN E 268 MG
CAPSULE ORAL DAILY
COMMUNITY

## 2021-12-15 RX ORDER — AMOXICILLIN 500 MG
CAPSULE ORAL DAILY
COMMUNITY

## 2021-12-15 RX ORDER — MULTIPLE VITAMINS W/ MINERALS TAB 9MG-400MCG
1 TAB ORAL DAILY
COMMUNITY

## 2021-12-15 NOTE — PROGRESS NOTES
Patient Name: Debra Barnett   Visit Date: 12/15/2021   Patient ID: [id number]  Provider: BRIA Lee    Sex: female  Location:  Location Address:  Location Phone: [phone] [place] [address] [phone]    YOB: 1955  Age: 66 y.o.   Primary Care Provider Ivan Clemente MD      Referring Provider: No ref. provider found        Chief Complaint  Follow-up (6 mo f/u)    History of Present Illness    Patient initially presented May 2003 with history of GERD requiring PPI therapy and hx of Berman's. Patient had EGD with RFA for Berman's with Dr Kasper x 3 in 2017, after EGD w Dr Carpio showed low-grade dysplasia in 9/2016. She cont to follow w Dr Kasper for EGDs. Last EGD 4/2018 w DR. Kasper--indef. for dysplasia.  Fatty liver noted in 2012, liver biopsy in 2014+ for SINGH (neg. for fibrosis/cirrhosis), serum hepatic workup negative 04/20/2014.     Pt has had hx of anemia.  PCP notified pt anemic again 8/2018.   Capsule endoscopy 12/9/17 NEGATIVE except for gastritis.     Colonoscopy 7/28/2020: Adequate prep, diverticula seen in the sigmoid, 4 mm polyp in the ascending colon completely removed--adenoma, 2 to 3 cm submucosal soft lesion     Patient was last seen June 2021 and had no GI complaints but reported having increase her PPI therapy (Prilosec 40) to twice daily dosing, advised patient she needed follow-up with DR Kasper    Liver ultrasound 7/29/2021: Diffuse fatty liver  10/24/2021: CBC unremarkable, in July INR 0.9, liver profile unremarkable    Pt states she's had EGD x 2 this year w Dr Kasper since last visit in June, describes ablation, and describes may have surgery, Nissen. On Prilosec 40 BID.   Pt has lost 2# since being here.   Pt states she cannot eat after 4 d/t severe HB.   No abd pain.   Sometimes irreg BM's, noticed improvement w Activia. NO blood in stool.    Past Medical History:   Diagnosis Date   • Anemia    • Berman's esophagus with low grade  "dysplasia    • Cardiac murmur    • Corns and callus    • DM (diabetes mellitus) (HCC)    • Fatty liver disease, nonalcoholic    • GERD (gastroesophageal reflux disease)    • HBP (high blood pressure)    • Heartburn    • Hepatitis A 1971   • Hiatal hernia    • Hyperlipidemia    • Renal stone        Past Surgical History:   Procedure Laterality Date   • APPENDECTOMY     • CHOLECYSTECTOMY     • COLONOSCOPY  08/11/2016    DR MORAN   • COLONOSCOPY  07/28/2020   • ENDOSCOPY  09/15/2016    DR MORAN   • ENDOSCOPY  04/04/2018   • FOOT SURGERY     • HYSTERECTOMY     • LAPAROSCOPIC TUBAL LIGATION         Allergies   Allergen Reactions   • Gabapentin Unknown - High Severity       Family History   Problem Relation Age of Onset   • Breast cancer Mother 60   • Diabetes Mother    • Heart disease Mother    • Heart attack Father    • Heart disease Father    • Urolithiasis Sister    • Lung cancer Brother 60   • Colon cancer Neg Hx         Social History     Tobacco Use   • Smoking status: Former Smoker   • Smokeless tobacco: Never Used   Vaping Use   • Vaping Use: Never used   Substance Use Topics   • Alcohol use: Yes     Comment: RARELY   • Drug use: Not on file       Objective     Vital Signs:   /71 (BP Location: Left arm, Patient Position: Sitting, Cuff Size: Adult)   Pulse 77   Ht 156.2 cm (61.5\")   Wt 64 kg (141 lb 3.2 oz)   BMI 26.25 kg/m²       Physical Exam  Constitutional:       General: The patient is not in acute distress.     Appearance: Normal appearance.   HENT:      Head: Normocephalic and atraumatic.      Nose: Nose normal.   Pulmonary:      Effort: Pulmonary effort is normal. No respiratory distress.   Abdominal:      General: Abdomen is flat.      Palpations: Abdomen is soft. There is no mass.      Tenderness: There is no abdominal tenderness. There is no guarding.   Musculoskeletal:      Cervical back: Neck supple.      Right lower leg: No edema.      Left lower leg: No edema.   Skin:     General: Skin " is warm and dry.   Neurological:      General: No focal deficit present.      Mental Status: The patient is alert and oriented to person, place, and time.      Gait: Gait normal.   Psychiatric:         Mood and Affect: Mood normal.         Speech: Speech normal.         Behavior: Behavior normal.         Thought Content: Thought content normal.     Result Review :   The following data was reviewed by: BRIA Lee on 12/15/2021:  CMP    CMP 7/29/21 10/24/21   Glucose  131 (A)   BUN  9   Creatinine  0.79   eGFR Non African Am  72   Sodium  128 (A)   Potassium  3.9   Chloride  90 (A)   Calcium  8.9   Albumin 4.50 4.40   Total Bilirubin 0.5 0.5   Alkaline Phosphatase 50 48   AST (SGOT) 25 26   ALT (SGPT) 31 36 (A)   (A) Abnormal value            CBC    CBC 7/29/21 10/24/21   WBC 6.36 6.49   RBC 4.61 4.19   Hemoglobin 13.4 12.5   Hematocrit 41.4 36.0   MCV 89.8 85.9   MCH 29.1 29.8   MCHC 32.4 34.7   RDW 12.9 12.3   Platelets 343 256                     Assessment and Plan    Diagnoses and all orders for this visit:    1. SINGH (nonalcoholic steatohepatitis) (Primary)  -     Liver Elastography    2. Berman's esophagus with low grade dysplasia    3. Diverticulosis            Follow Up   Return in about 6 months (around 6/15/2022).   Encouraged cont f/u with Dr Kasper  Suggested Fibroscan to further assess fatty liver--pt requested to wait until end of Jan  Cont to try to lose weight. 2-4 c. Coffee/day recommended  Patient was given instructions and counseling regarding her condition or for health maintenance advice. Please see specific information pulled into the AVS if appropriate.

## 2021-12-26 ENCOUNTER — HOSPITAL ENCOUNTER (EMERGENCY)
Facility: HOSPITAL | Age: 66
Discharge: HOME | End: 2021-12-26
Attending: EMERGENCY MEDICINE | Admitting: EMERGENCY MEDICINE

## 2021-12-26 ENCOUNTER — APPOINTMENT (OUTPATIENT)
Dept: GENERAL RADIOLOGY | Facility: HOSPITAL | Age: 66
End: 2021-12-26

## 2021-12-26 VITALS
RESPIRATION RATE: 18 BRPM | TEMPERATURE: 98.1 F | DIASTOLIC BLOOD PRESSURE: 86 MMHG | HEIGHT: 61 IN | SYSTOLIC BLOOD PRESSURE: 134 MMHG | WEIGHT: 137.13 LBS | HEART RATE: 80 BPM | OXYGEN SATURATION: 96 % | BODY MASS INDEX: 25.89 KG/M2

## 2021-12-26 DIAGNOSIS — R09.81 NASAL CONGESTION: ICD-10-CM

## 2021-12-26 DIAGNOSIS — R05.9 COUGH: Primary | ICD-10-CM

## 2021-12-26 LAB
FLUAV AG NPH QL: NEGATIVE
FLUBV AG NPH QL IA: NEGATIVE

## 2021-12-26 PROCEDURE — 87804 INFLUENZA ASSAY W/OPTIC: CPT

## 2021-12-26 PROCEDURE — U0004 COV-19 TEST NON-CDC HGH THRU: HCPCS

## 2021-12-26 PROCEDURE — U0005 INFEC AGEN DETEC AMPLI PROBE: HCPCS

## 2021-12-26 PROCEDURE — 99283 EMERGENCY DEPT VISIT LOW MDM: CPT

## 2021-12-26 PROCEDURE — 71045 X-RAY EXAM CHEST 1 VIEW: CPT

## 2021-12-26 RX ORDER — IBUPROFEN 400 MG/1
800 TABLET ORAL ONCE
Status: COMPLETED | OUTPATIENT
Start: 2021-12-26 | End: 2021-12-26

## 2021-12-26 RX ORDER — BROMPHENIRAMINE MALEATE, PSEUDOEPHEDRINE HYDROCHLORIDE, AND DEXTROMETHORPHAN HYDROBROMIDE 2; 30; 10 MG/5ML; MG/5ML; MG/5ML
5 SYRUP ORAL 4 TIMES DAILY PRN
Qty: 120 ML | Refills: 0 | Status: SHIPPED | OUTPATIENT
Start: 2021-12-26 | End: 2022-02-21

## 2021-12-26 RX ORDER — AZELASTINE 1 MG/ML
2 SPRAY, METERED NASAL 2 TIMES DAILY
Qty: 30 ML | Refills: 0 | Status: SHIPPED | OUTPATIENT
Start: 2021-12-26 | End: 2022-02-21

## 2021-12-26 RX ADMIN — IBUPROFEN 800 MG: 400 TABLET, FILM COATED ORAL at 19:28

## 2021-12-27 LAB — SARS-COV-2 RNA PNL SPEC NAA+PROBE: NOT DETECTED

## 2021-12-27 NOTE — DISCHARGE INSTRUCTIONS
Please follow-up with your primary care provider in 3 to 5 days if you continue to feel ill.  Your Covid test remains pending.  The hospital will only call you tomorrow if your test is positive.  Please continue to isolate until your results are available.  Return to the ER immediately if you develop shortness of breath, chest tightness, a fever that cannot be controlled with Tylenol or Motrin, or worsening of cough.

## 2021-12-27 NOTE — ED PROVIDER NOTES
Subjective   Patient is a 66-year-old female that presents to emergency department today complaining of cough, sore throat, nasal congestion, and pain with inspiration.  She states she started having a sore throat approximately 2 to 3 days ago but her cough started 3 to 4 weeks ago.  She rates her pain with inspiration at this time is a 4 on a scale of 0-10 however states earlier today it was an 8-9.  She is fully vaccinated.  She denies any recent nausea, vomiting, diarrhea, fever, or chills.      History provided by:  Patient   used: No        Review of Systems   Constitutional: Negative for chills and fever.   HENT: Positive for congestion and sore throat. Negative for ear pain.    Eyes: Negative for pain.   Respiratory: Positive for cough. Negative for chest tightness and shortness of breath.    Cardiovascular: Negative for chest pain.   Gastrointestinal: Negative for abdominal pain, diarrhea, nausea and vomiting.   Genitourinary: Negative for flank pain and hematuria.   Musculoskeletal: Negative for joint swelling.   Skin: Negative for pallor.   Neurological: Negative for seizures and headaches.   All other systems reviewed and are negative.      Past Medical History:   Diagnosis Date   • Anemia    • Berman's esophagus with low grade dysplasia    • Cardiac murmur    • Corns and callus    • DM (diabetes mellitus) (HCC)    • Fatty liver disease, nonalcoholic    • GERD (gastroesophageal reflux disease)    • HBP (high blood pressure)    • Heartburn    • Hepatitis A 1971   • Hiatal hernia    • Hyperlipidemia    • Renal stone        Allergies   Allergen Reactions   • Gabapentin Unknown - High Severity       Past Surgical History:   Procedure Laterality Date   • APPENDECTOMY     • CHOLECYSTECTOMY     • COLONOSCOPY  08/11/2016    DR MORAN   • COLONOSCOPY  07/28/2020   • ENDOSCOPY  09/15/2016    DR MORAN   • ENDOSCOPY  04/04/2018   • FOOT SURGERY     • HYSTERECTOMY     • LAPAROSCOPIC TUBAL  LIGATION         Family History   Problem Relation Age of Onset   • Breast cancer Mother 60   • Diabetes Mother    • Heart disease Mother    • Heart attack Father    • Heart disease Father    • Urolithiasis Sister    • Lung cancer Brother 60   • Colon cancer Neg Hx        Social History     Socioeconomic History   • Marital status:    Tobacco Use   • Smoking status: Former Smoker   • Smokeless tobacco: Never Used   Vaping Use   • Vaping Use: Never used   Substance and Sexual Activity   • Alcohol use: Yes     Comment: RARELY           Objective   Physical Exam  Vitals and nursing note reviewed.   Constitutional:       General: She is not in acute distress.     Appearance: Normal appearance. She is not toxic-appearing.   HENT:      Head: Normocephalic and atraumatic.      Mouth/Throat:      Mouth: Mucous membranes are moist.      Pharynx: No pharyngeal swelling, oropharyngeal exudate or posterior oropharyngeal erythema.      Tonsils: No tonsillar exudate or tonsillar abscesses.   Eyes:      General: No scleral icterus.  Cardiovascular:      Rate and Rhythm: Normal rate and regular rhythm.      Pulses: Normal pulses.      Heart sounds: Normal heart sounds.   Pulmonary:      Effort: Pulmonary effort is normal. No respiratory distress.      Breath sounds: Normal breath sounds. No stridor. No wheezing or rhonchi.   Abdominal:      General: Abdomen is flat.      Palpations: Abdomen is soft.      Tenderness: There is no abdominal tenderness.   Musculoskeletal:         General: Normal range of motion.      Cervical back: Normal range of motion and neck supple.   Skin:     General: Skin is warm and dry.   Neurological:      Mental Status: She is alert and oriented to person, place, and time. Mental status is at baseline.         Procedures           ED Course  ED Course as of 12/27/21 0207   Sun Dec 26, 2021   2023 Patient's blood pressure is noted to have improved. [MS]      ED Course User Index  [MS] Paola Rouse  BRIA Terrell                                                 [facility]  Number of Diagnoses or Management Options  Cough: new and does not require workup  Nasal congestion: new and does not require workup  Diagnosis management comments: Influenza a negative  Influenza B negative  Covid remains pending  Patient was awake alert and oriented throughout entire ER visit with oxygen saturations in high 90s.  Chest x-ray was completed to rule out any emergent respiratory or cardiac disease process that might warrant hospital admission or further work-up and was negative.  I have spoke with the patient and I have explained the patient´s condition, diagnoses and treatment plan based on the information available to me at this time. I have answered all questions and addressed any concerns. The patient has a good understanding of the patient´s diagnosis, condition, and treatment plan as can be expected at this point. The vital signs have been stable. The patient´s condition is stable and appropriate for discharge from the emergency department.      The patient will pursue further outpatient evaluation with the primary care physician or other designated or consulting physician as outlined in the discharge instructions. They are agreeable to this plan of care and follow-up instructions have been explained in detail. The patient has received these instructions in written format and have expressed an understanding of the discharge instructions. The patient is aware that any significant change in condition or worsening of symptoms should prompt an immediate return to this or the closest emergency department or call to 911.         Amount and/or Complexity of Data Reviewed  Clinical lab tests: reviewed and ordered  Tests in the radiology section of CPT®: reviewed and ordered  Review and summarize past medical records: yes (I have personally reviewed patient's previous medical encounters.  )    Risk of Complications, Morbidity, and/or  Mortality  Presenting problems: low  Diagnostic procedures: low  Management options: low    Patient Progress  Patient progress: stable      Final diagnoses:   Cough   Nasal congestion       ED Disposition  ED Disposition     ED Disposition Condition Comment    Discharge Stable           Ivan Clemente MD  [address] [place] [address]  [phone]    In 3 days  As needed, If symptoms worsen         Medication List      New Prescriptions    azelastine 0.1 % nasal spray  Commonly known as: ASTELIN  2 sprays into the nostril(s) as directed by provider 2 (Two) Times a Day. Use in each nostril as directed     brompheniramine-pseudoephedrine-DM 30-2-10 MG/5ML syrup  Take 5 mL by mouth 4 (Four) Times a Day As Needed for Congestion or Cough.           Where to Get Your Medications      These medications were sent to [facility] - [place], [place] - [address] - [phone]  - [phone] FX  [address] [facility] [place] [address]    Phone: [phone]   · azelastine 0.1 % nasal spray  · brompheniramine-pseudoephedrine-DM 30-2-10 MG/5ML syrup          Paola Rouse, APRN  12/27/21 5308

## 2022-06-15 ENCOUNTER — OFFICE VISIT (OUTPATIENT)
Dept: GASTROENTEROLOGY | Facility: CLINIC | Age: 67
End: 2022-06-15

## 2022-06-15 VITALS
DIASTOLIC BLOOD PRESSURE: 85 MMHG | HEIGHT: 61 IN | SYSTOLIC BLOOD PRESSURE: 149 MMHG | HEART RATE: 72 BPM | BODY MASS INDEX: 24.32 KG/M2 | WEIGHT: 128.8 LBS

## 2022-06-15 DIAGNOSIS — K75.81 NASH (NONALCOHOLIC STEATOHEPATITIS): Primary | ICD-10-CM

## 2022-06-15 DIAGNOSIS — K22.710 BARRETT'S ESOPHAGUS WITH LOW GRADE DYSPLASIA: ICD-10-CM

## 2022-06-15 DIAGNOSIS — Z86.010 PERSONAL HISTORY OF COLONIC POLYPS: ICD-10-CM

## 2022-06-15 PROCEDURE — 99213 OFFICE O/P EST LOW 20 MIN: CPT | Performed by: NURSE PRACTITIONER

## 2022-06-15 RX ORDER — CYCLOBENZAPRINE HCL 10 MG
10 TABLET ORAL 2 TIMES DAILY
COMMUNITY
Start: 2022-04-06

## 2022-06-15 RX ORDER — SIMVASTATIN 40 MG
TABLET ORAL
COMMUNITY
Start: 2022-04-11 | End: 2023-02-14 | Stop reason: ALTCHOICE

## 2022-06-15 NOTE — PROGRESS NOTES
Patient Name: Debra Barnett   Visit Date: 06/15/2022   Patient ID: [id number]  Provider: BRIA Lee    Sex: female  Location:  Location Address:  Location Phone: [address]  [place] [address] [phone]    YOB: 1955  Age: 67 y.o.   Primary Care Provider Ivan Clemente MD      Referring Provider: No ref. provider found        Chief Complaint  Fatty Liver  (6 month follow up )    History of Present Illness  Patient initially presented May 2003 with history of GERD requiring PPI therapy and hx of Berman's. Patient had EGD with RFA for Berman's with Dr Kasper x 3 in 2017, after EGD w Dr Carpio showed low-grade dysplasia in 9/2016. She cont to follow w Dr Kasper for EGDs. Last EGD 4/2018 w DR. Kasper--indef. for dysplasia.  Fatty liver noted in 2012, liver biopsy in 2014+ for SINGH (neg. for fibrosis/cirrhosis), serum hepatic workup negative 04/20/2014.    Colonoscopy 7/28/2020: Adequate prep, diverticula seen in the sigmoid, 4 mm polyp in the ascending colon completely removed--adenoma, 2 to 3 cm submucosal soft lesion  Last liver ultrasound July 2021-fatty liver    Pt last seen  12/2021 no GI complaints other than severe heartburn.    Pt states she's had HH repair through Dr Jenaro Contreras at [facility] in March  No HB since then, has f/u w Ranjith in July.   No c/o w Bm's no abd pain  Pt has lost 14# since being here. States eating smaller portions since HH repair.  No abd pain. Has more early satiety. Drinking coffee but only 1 c/day.   Past Medical History:   Diagnosis Date   • Anemia    • Berman's esophagus with low grade dysplasia    • Cardiac murmur    • Corns and callus    • DM (diabetes mellitus) (HCC)    • Fatty liver disease, nonalcoholic    • GERD (gastroesophageal reflux disease)    • HBP (high blood pressure)    • Heartburn    • Hepatitis A 1971   • Hiatal hernia    • Hyperlipidemia    • Renal stone        Past Surgical History:   Procedure Laterality Date  "  • APPENDECTOMY     • CHOLECYSTECTOMY     • COLONOSCOPY  08/11/2016    DR MORAN   • COLONOSCOPY  07/28/2020   • ENDOSCOPY  09/15/2016    DR MORAN   • ENDOSCOPY  04/04/2018   • FOOT SURGERY     • HYSTERECTOMY     • LAPAROSCOPIC TUBAL LIGATION     • TUBAL ABDOMINAL LIGATION         Allergies   Allergen Reactions   • Gabapentin Unknown - High Severity, Dizziness, GI Intolerance and Unknown (See Comments)     Other reaction(s): Dizzy         Family History   Problem Relation Age of Onset   • Breast cancer Mother 60   • Diabetes Mother    • Heart disease Mother    • Heart attack Father    • Heart disease Father    • Urolithiasis Sister    • Lung cancer Brother 60   • Colon cancer Neg Hx         Social History     Tobacco Use   • Smoking status: Former Smoker   • Smokeless tobacco: Never Used   Vaping Use   • Vaping Use: Never used   Substance Use Topics   • Alcohol use: Not Currently     Comment: RARELY   • Drug use: Never       Objective     Vital Signs:   /85 (BP Location: Left arm, Patient Position: Sitting, Cuff Size: Adult)   Pulse 72   Ht 154.9 cm (61\")   Wt 58.4 kg (128 lb 12.8 oz)   BMI 24.34 kg/m²       Physical Exam  Constitutional:       General: The patient is not in acute distress.     Appearance: Normal appearance.   HENT:      Head: Normocephalic and atraumatic.      Nose: Nose normal.   Pulmonary:      Effort: Pulmonary effort is normal. No respiratory distress.   Abdominal:      General: Abdomen is flat.      Palpations: Abdomen is soft. There is no mass.      Tenderness: There is no abdominal tenderness. There is no guarding.   Musculoskeletal:      Cervical back: Neck supple.      Right lower leg: No edema.      Left lower leg: No edema.   Skin:     General: Skin is warm and dry.   Neurological:      General: No focal deficit present.      Mental Status: The patient is alert and oriented to person, place, and time.      Gait: Gait normal.   Psychiatric:         Mood and Affect: Mood " normal.         Speech: Speech normal.         Behavior: Behavior normal.         Thought Content: Thought content normal.     Result Review :   The following data was reviewed by: BRIA Lee on 06/15/2022:    CBC w/diff    CBC w/Diff 7/29/21 10/24/21   WBC 6.36 6.49   RBC 4.61 4.19   Hemoglobin 13.4 12.5   Hematocrit 41.4 36.0   MCV 89.8 85.9   MCH 29.1 29.8   MCHC 32.4 34.7   RDW 12.9 12.3   Platelets 343 256   Neutrophil Rel %  46.7   Immature Granulocyte Rel %  0.2   Lymphocyte Rel %  40.7   Monocyte Rel %  9.7   Eosinophil Rel %  2.2   Basophil Rel %  0.5           CMP    CMP 7/29/21 10/24/21   Glucose  131 (A)   BUN  9   Creatinine  0.79   eGFR Non African Am  72   Sodium  128 (A)   Potassium  3.9   Chloride  90 (A)   Calcium  8.9   Albumin 4.50 4.40   Total Bilirubin 0.5 0.5   Alkaline Phosphatase 50 48   AST (SGOT) 25 26   ALT (SGPT) 31 36 (A)   (A) Abnormal value                          Assessment and Plan    Diagnoses and all orders for this visit:    1. SINGH (nonalcoholic steatohepatitis) (Primary)  -     CBC (No Diff); Future  -     Hepatic Function Panel; Future  -     Protime-INR; Future  -     US Liver; Future    2. Berman's esophagus with low grade dysplasia    3. Personal history of colonic polyps            Follow Up   Return in about 6 months (around 12/15/2022).   Check labs and liver ultrasound, due now  Continue with low-carb diet and 2 to 4 cups of coffee per day.  Patient was given instructions and counseling regarding her condition or for health maintenance advice. Please see specific information pulled into the AVS if appropriate.

## 2022-07-11 ENCOUNTER — APPOINTMENT (OUTPATIENT)
Dept: ULTRASOUND IMAGING | Facility: HOSPITAL | Age: 67
End: 2022-07-11

## 2022-07-19 ENCOUNTER — LAB (OUTPATIENT)
Dept: LAB | Facility: HOSPITAL | Age: 67
End: 2022-07-19

## 2022-07-19 ENCOUNTER — HOSPITAL ENCOUNTER (OUTPATIENT)
Dept: ULTRASOUND IMAGING | Facility: HOSPITAL | Age: 67
Discharge: HOME | End: 2022-07-19

## 2022-07-19 DIAGNOSIS — K75.81 NASH (NONALCOHOLIC STEATOHEPATITIS): ICD-10-CM

## 2022-07-19 LAB
ALBUMIN SERPL-MCNC: 4.5 G/DL (ref 3.5–5.2)
ALP SERPL-CCNC: 41 U/L (ref 39–117)
ALT SERPL W P-5'-P-CCNC: 17 U/L (ref 1–33)
AST SERPL-CCNC: 20 U/L (ref 1–32)
BILIRUB CONJ SERPL-MCNC: <0.2 MG/DL (ref 0–0.3)
BILIRUB INDIRECT SERPL-MCNC: NORMAL MG/DL
BILIRUB SERPL-MCNC: 0.6 MG/DL (ref 0–1.2)
DEPRECATED RDW RBC AUTO: 42.7 FL (ref 37–54)
ERYTHROCYTE [DISTWIDTH] IN BLOOD BY AUTOMATED COUNT: 13.1 % (ref 12.3–15.4)
HCT VFR BLD AUTO: 40 % (ref 34–46.6)
HGB BLD-MCNC: 13 G/DL (ref 12–15.9)
INR PPP: 1.02 (ref 0.86–1.15)
MCH RBC QN AUTO: 29.1 PG (ref 26.6–33)
MCHC RBC AUTO-ENTMCNC: 32.5 G/DL (ref 31.5–35.7)
MCV RBC AUTO: 89.5 FL (ref 79–97)
PLATELET # BLD AUTO: 284 10*3/MM3 (ref 140–450)
PMV BLD AUTO: 9.5 FL (ref 6–12)
PROT SERPL-MCNC: 6.9 G/DL (ref 6–8.5)
PROTHROMBIN TIME: 13.5 SECONDS (ref 11.8–14.9)
RBC # BLD AUTO: 4.47 10*6/MM3 (ref 3.77–5.28)
WBC NRBC COR # BLD: 6.42 10*3/MM3 (ref 3.4–10.8)

## 2022-07-19 PROCEDURE — 85610 PROTHROMBIN TIME: CPT

## 2022-07-19 PROCEDURE — 80076 HEPATIC FUNCTION PANEL: CPT

## 2022-07-19 PROCEDURE — 76705 ECHO EXAM OF ABDOMEN: CPT

## 2022-07-19 PROCEDURE — 85027 COMPLETE CBC AUTOMATED: CPT

## 2022-07-19 PROCEDURE — 36415 COLL VENOUS BLD VENIPUNCTURE: CPT

## 2023-02-14 ENCOUNTER — OFFICE VISIT (OUTPATIENT)
Dept: CARDIOLOGY | Facility: CLINIC | Age: 68
End: 2023-02-14
Payer: MEDICARE

## 2023-02-14 VITALS
SYSTOLIC BLOOD PRESSURE: 126 MMHG | HEIGHT: 61 IN | BODY MASS INDEX: 23.03 KG/M2 | WEIGHT: 122 LBS | HEART RATE: 65 BPM | DIASTOLIC BLOOD PRESSURE: 71 MMHG

## 2023-02-14 DIAGNOSIS — E78.5 HYPERLIPIDEMIA LDL GOAL <70: ICD-10-CM

## 2023-02-14 DIAGNOSIS — R00.2 INTERMITTENT PALPITATIONS: ICD-10-CM

## 2023-02-14 DIAGNOSIS — R07.89 CHEST PAIN, ATYPICAL: Primary | ICD-10-CM

## 2023-02-14 DIAGNOSIS — R06.09 EXERTIONAL DYSPNEA: ICD-10-CM

## 2023-02-14 DIAGNOSIS — I10 ESSENTIAL HYPERTENSION: ICD-10-CM

## 2023-02-14 PROCEDURE — 99203 OFFICE O/P NEW LOW 30 MIN: CPT | Performed by: INTERNAL MEDICINE

## 2023-02-14 PROCEDURE — 3074F SYST BP LT 130 MM HG: CPT | Performed by: INTERNAL MEDICINE

## 2023-02-14 PROCEDURE — 3078F DIAST BP <80 MM HG: CPT | Performed by: INTERNAL MEDICINE

## 2023-02-14 RX ORDER — EZETIMIBE 10 MG/1
1 TABLET ORAL DAILY
COMMUNITY
Start: 2023-02-02

## 2023-04-03 ENCOUNTER — HOSPITAL ENCOUNTER (OUTPATIENT)
Dept: CARDIOLOGY | Facility: HOSPITAL | Age: 68
Discharge: HOME | End: 2023-04-03
Admitting: INTERNAL MEDICINE
Payer: MEDICARE

## 2023-04-03 DIAGNOSIS — R07.89 CHEST PAIN, ATYPICAL: ICD-10-CM

## 2023-04-03 DIAGNOSIS — I10 ESSENTIAL HYPERTENSION: ICD-10-CM

## 2023-04-03 DIAGNOSIS — R06.09 EXERTIONAL DYSPNEA: ICD-10-CM

## 2023-04-03 DIAGNOSIS — R00.2 INTERMITTENT PALPITATIONS: ICD-10-CM

## 2023-04-03 LAB
BH CV ECHO MEAS - AO ROOT DIAM: 2.9 CM
BH CV ECHO MEAS - EDV(MOD-SP2): 45 ML
BH CV ECHO MEAS - EDV(MOD-SP4): 55 ML
BH CV ECHO MEAS - EF(MOD-BP): 67 %
BH CV ECHO MEAS - EF(MOD-SP2): 70 %
BH CV ECHO MEAS - EF(MOD-SP4): 65 %
BH CV ECHO MEAS - ESV(MOD-SP2): 14 ML
BH CV ECHO MEAS - ESV(MOD-SP4): 19 ML
BH CV ECHO MEAS - IVSD: 1.2 CM
BH CV ECHO MEAS - LA DIMENSION: 3.5 CM
BH CV ECHO MEAS - LAT PEAK E' VEL: 8.4 CM/SEC
BH CV ECHO MEAS - LVIDD: 3.6 CM
BH CV ECHO MEAS - LVIDS: 2.3 CM
BH CV ECHO MEAS - LVOT DIAM: 2 CM
BH CV ECHO MEAS - LVPWD: 1.1 CM
BH CV ECHO MEAS - MED PEAK E' VEL: 6 CM/SEC
BH CV ECHO MEAS - MV A MAX VEL: 102 CM/SEC
BH CV ECHO MEAS - MV DEC TIME: 82 MSEC
BH CV ECHO MEAS - MV E MAX VEL: 82 CM/SEC
BH CV ECHO MEAS - MV E/A: 0.8
BH CV ECHO MEAS - RAP SYSTOLE: 33 MMHG
BH CV ECHO MEAS - RVDD: 2.5 CM
BH CV ECHO MEAS - RVSP: 3 MMHG
BH CV ECHO MEAS - TR MAX PG: 30 MMHG
BH CV ECHO MEAS - TR MAX VEL: 273 CM/SEC
BH CV ECHO MEASUREMENTS AVERAGE E/E' RATIO: 11.39
IVRT: 98 MSEC
LEFT ATRIUM VOLUME INDEX: 12.5 ML/M2
LEFT ATRIUM VOLUME: 19.1 ML
MAXIMAL PREDICTED HEART RATE: 150 BPM
STRESS TARGET HR: 128 BPM

## 2023-04-03 PROCEDURE — 93306 TTE W/DOPPLER COMPLETE: CPT | Performed by: INTERNAL MEDICINE

## 2023-04-03 PROCEDURE — 93306 TTE W/DOPPLER COMPLETE: CPT

## 2023-04-04 ENCOUNTER — TELEPHONE (OUTPATIENT)
Dept: CARDIOLOGY | Facility: CLINIC | Age: 68
End: 2023-04-04
Payer: MEDICARE

## 2023-04-04 NOTE — TELEPHONE ENCOUNTER
----- Message from BRIA Hoffman sent at 4/3/2023  8:47 PM EDT -----  Echocardiogram shows normal function of the heart muscle wall, and normal valvular function.  Please inquire if she has scheduled her stress test?

## 2023-05-30 ENCOUNTER — HOSPITAL ENCOUNTER (EMERGENCY)
Facility: HOSPITAL | Age: 68
Discharge: LEFT BEFORE DISCHARGE | End: 2023-05-30
Admitting: EMERGENCY MEDICINE
Payer: MEDICARE

## 2023-05-30 VITALS
HEART RATE: 89 BPM | TEMPERATURE: 98.2 F | DIASTOLIC BLOOD PRESSURE: 90 MMHG | OXYGEN SATURATION: 99 % | HEIGHT: 61 IN | WEIGHT: 123.9 LBS | BODY MASS INDEX: 23.39 KG/M2 | SYSTOLIC BLOOD PRESSURE: 180 MMHG | RESPIRATION RATE: 18 BRPM

## 2023-05-30 LAB
ALBUMIN SERPL-MCNC: 4.7 G/DL (ref 3.5–5.2)
ALBUMIN/GLOB SERPL: 1.6 G/DL
ALP SERPL-CCNC: 66 U/L (ref 39–117)
ALT SERPL W P-5'-P-CCNC: 13 U/L (ref 1–33)
ANION GAP SERPL CALCULATED.3IONS-SCNC: 14.8 MMOL/L (ref 5–15)
AST SERPL-CCNC: 18 U/L (ref 1–32)
BASOPHILS # BLD AUTO: 0.04 10*3/MM3 (ref 0–0.2)
BASOPHILS NFR BLD AUTO: 0.5 % (ref 0–1.5)
BILIRUB SERPL-MCNC: 0.4 MG/DL (ref 0–1.2)
BUN SERPL-MCNC: 16 MG/DL (ref 8–23)
BUN/CREAT SERPL: 13.7 (ref 7–25)
CALCIUM SPEC-SCNC: 10 MG/DL (ref 8.6–10.5)
CHLORIDE SERPL-SCNC: 103 MMOL/L (ref 98–107)
CO2 SERPL-SCNC: 24.2 MMOL/L (ref 22–29)
CREAT SERPL-MCNC: 1.17 MG/DL (ref 0.57–1)
DEPRECATED RDW RBC AUTO: 42.5 FL (ref 37–54)
EGFRCR SERPLBLD CKD-EPI 2021: 50.3 ML/MIN/1.73
EOSINOPHIL # BLD AUTO: 0.24 10*3/MM3 (ref 0–0.4)
EOSINOPHIL NFR BLD AUTO: 3.1 % (ref 0.3–6.2)
ERYTHROCYTE [DISTWIDTH] IN BLOOD BY AUTOMATED COUNT: 13.1 % (ref 12.3–15.4)
FLUAV AG NPH QL: NEGATIVE
FLUBV AG NPH QL IA: NEGATIVE
GLOBULIN UR ELPH-MCNC: 3 GM/DL
GLUCOSE SERPL-MCNC: 168 MG/DL (ref 65–99)
HCT VFR BLD AUTO: 36.6 % (ref 34–46.6)
HGB BLD-MCNC: 12.6 G/DL (ref 12–15.9)
HOLD SPECIMEN: NORMAL
HOLD SPECIMEN: NORMAL
IMM GRANULOCYTES # BLD AUTO: 0.01 10*3/MM3 (ref 0–0.05)
IMM GRANULOCYTES NFR BLD AUTO: 0.1 % (ref 0–0.5)
LYMPHOCYTES # BLD AUTO: 2.86 10*3/MM3 (ref 0.7–3.1)
LYMPHOCYTES NFR BLD AUTO: 36.9 % (ref 19.6–45.3)
MCH RBC QN AUTO: 30.5 PG (ref 26.6–33)
MCHC RBC AUTO-ENTMCNC: 34.4 G/DL (ref 31.5–35.7)
MCV RBC AUTO: 88.6 FL (ref 79–97)
MONOCYTES # BLD AUTO: 0.74 10*3/MM3 (ref 0.1–0.9)
MONOCYTES NFR BLD AUTO: 9.5 % (ref 5–12)
NEUTROPHILS NFR BLD AUTO: 3.87 10*3/MM3 (ref 1.7–7)
NEUTROPHILS NFR BLD AUTO: 49.9 % (ref 42.7–76)
NRBC BLD AUTO-RTO: 0 /100 WBC (ref 0–0.2)
PLATELET # BLD AUTO: 300 10*3/MM3 (ref 140–450)
PMV BLD AUTO: 9 FL (ref 6–12)
POTASSIUM SERPL-SCNC: 3.9 MMOL/L (ref 3.5–5.2)
PROT SERPL-MCNC: 7.7 G/DL (ref 6–8.5)
RBC # BLD AUTO: 4.13 10*6/MM3 (ref 3.77–5.28)
SODIUM SERPL-SCNC: 142 MMOL/L (ref 136–145)
WBC NRBC COR # BLD: 7.76 10*3/MM3 (ref 3.4–10.8)
WHOLE BLOOD HOLD COAG: NORMAL
WHOLE BLOOD HOLD SPECIMEN: NORMAL

## 2023-05-30 PROCEDURE — 80053 COMPREHEN METABOLIC PANEL: CPT

## 2023-05-30 PROCEDURE — 87635 SARS-COV-2 COVID-19 AMP PRB: CPT

## 2023-05-30 PROCEDURE — 85025 COMPLETE CBC W/AUTO DIFF WBC: CPT

## 2023-05-30 PROCEDURE — 36415 COLL VENOUS BLD VENIPUNCTURE: CPT

## 2023-05-30 PROCEDURE — 87468 ANAPLSMA PHGCYTOPHLM AMP PRB: CPT | Performed by: REGISTERED NURSE

## 2023-05-30 PROCEDURE — 99282 EMERGENCY DEPT VISIT SF MDM: CPT

## 2023-05-30 PROCEDURE — 87484 EHRLICHA CHAFFEENSIS AMP PRB: CPT | Performed by: REGISTERED NURSE

## 2023-05-30 PROCEDURE — 87804 INFLUENZA ASSAY W/OPTIC: CPT

## 2023-05-30 PROCEDURE — 87798 DETECT AGENT NOS DNA AMP: CPT | Performed by: REGISTERED NURSE

## 2023-05-30 PROCEDURE — 86618 LYME DISEASE ANTIBODY: CPT | Performed by: REGISTERED NURSE

## 2023-05-31 LAB — SARS-COV-2 RNA RESP QL NAA+PROBE: NOT DETECTED

## 2023-05-31 NOTE — ED PROVIDER NOTES
Time: 8:44 PM EDT  Date of encounter:  5/30/2023  Independent Historian/Clinical History and Information was obtained by:   Patient  Chief Complaint   Patient presents with   • Generalized Body Aches   • Fever       History is limited by: N/A    History of Present Illness:  Patient is a 68 y.o. year old female who presents to the emergency department for evaluation of fever/chills, body aches, joint pain.  Patient states that she has had numerous tick bites over the past 6 weeks.  Patient denies rashes.  She denies sick contacts.  BRIA Landrum    HPI    Patient Care Team  Primary Care Provider: Ivan Clemente MD    Past Medical History:     Allergies   Allergen Reactions   • Gabapentin Unknown - High Severity, Dizziness, GI Intolerance and Unknown (See Comments)     Other reaction(s): Dizzy       Past Medical History:   Diagnosis Date   • Anemia    • Berman's esophagus with low grade dysplasia    • Cardiac murmur    • Corns and callus    • DM (diabetes mellitus)    • Fatty liver disease, nonalcoholic    • GERD (gastroesophageal reflux disease)    • HBP (high blood pressure)    • Heartburn    • Hepatitis A 1971   • Hiatal hernia    • Hyperlipidemia    • Renal stone      Past Surgical History:   Procedure Laterality Date   • APPENDECTOMY     • CHOLECYSTECTOMY     • COLONOSCOPY  08/11/2016    DR MORAN   • COLONOSCOPY  07/28/2020   • ENDOSCOPY  09/15/2016    DR MORAN   • ENDOSCOPY  04/04/2018   • FOOT SURGERY     • HERNIA REPAIR     • HYSTERECTOMY     • LAPAROSCOPIC TUBAL LIGATION     • TUBAL ABDOMINAL LIGATION       Family History   Problem Relation Age of Onset   • Breast cancer Mother 60   • Diabetes Mother    • Heart disease Mother    • Heart attack Father    • Heart disease Father    • Urolithiasis Sister    • Lung cancer Brother 60   • Colon cancer Neg Hx        Home Medications:  Prior to Admission medications    Medication Sig Start Date End Date Taking? Authorizing Provider   amLODIPine (NORVASC) 5  MG tablet  4/23/21   Aleks Carlson MD   ascorbic acid (VITAMIN C) 500 MG tablet 500 mg. 7/14/21   Aleks Carlson MD   aspirin 81 MG EC tablet Aspir-81 81 mg oral tablet,delayed release (DR/EC) take 1 tablet (81 mg) by oral route once daily   Active    Aleks Carlson MD   cloNIDine (CATAPRES) 0.2 MG tablet  4/23/21   Aleks Carlson MD   cyanocobalamin (VITAMIN B-12) 1000 MCG tablet 1,000 mcg. 7/14/21   Aleks Carlson MD   cyclobenzaprine (FLEXERIL) 10 MG tablet Take 10 mg by mouth 2 (Two) Times a Day. 4/6/22   Aleks Carlson MD   ezetimibe (ZETIA) 10 MG tablet Take 1 tablet by mouth Daily. 2/2/23   Aleks Carlson MD   Garlic-DHA-EPA (GARLIC/EPA PO) as directed    Aleks Carlson MD   hydrOXYzine pamoate (VISTARIL) 25 MG capsule  4/23/21   Aleks Carlson MD   metFORMIN (GLUCOPHAGE) 1000 MG tablet  4/23/21   Aleks Carlson MD   metoprolol tartrate (LOPRESSOR) 100 MG tablet  4/23/21   Aleks Carlson MD   multivitamin with minerals tablet tablet Take 1 tablet by mouth Daily.    Aleks Carlson MD   Omega-3 Fatty Acids (fish oil) 1200 MG capsule capsule Daily.    Aleks Carlson MD   simvastatin (ZOCOR) 20 MG tablet  4/23/21   Aleks Carlson MD   vitamin E 1000 UNIT capsule 450 mg. 7/14/21   Emergency, Nurse Epic, RN   vitamin E 400 UNIT capsule Daily.    Aleks Carlson MD   Zinc 50 MG tablet 1 tablet with food    ProviderAleks MD   zinc gluconate 50 MG tablet   1 Tab, Oral, Daily, 0 Refill(s) 7/14/21   Emergency, Nurse Ashley, RN        Social History:   Social History     Tobacco Use   • Smoking status: Former   • Smokeless tobacco: Never   Vaping Use   • Vaping Use: Never used   Substance Use Topics   • Alcohol use: Not Currently     Comment: RARELY   • Drug use: Never         Review of Systems:  Review of Systems   Constitutional: Positive for fatigue and fever.   Musculoskeletal: Positive for arthralgias and  "myalgias.        Physical Exam:  /90 (BP Location: Right arm, Patient Position: Sitting)   Pulse 89   Temp 98.2 °F (36.8 °C) (Oral)   Resp 18   Ht 154.9 cm (61\")   Wt 56.2 kg (123 lb 14.4 oz)   SpO2 99%   BMI 23.41 kg/m²     Physical Exam  HENT:      Head: Normocephalic.      Mouth/Throat:      Mouth: Mucous membranes are moist.   Eyes:      Pupils: Pupils are equal, round, and reactive to light.   Pulmonary:      Effort: Pulmonary effort is normal.   Abdominal:      General: There is no distension.   Musculoskeletal:      Cervical back: Neck supple.   Skin:     General: Skin is warm and dry.   Neurological:      General: No focal deficit present.      Mental Status: She is alert and oriented to person, place, and time.   Psychiatric:         Mood and Affect: Mood normal.         Behavior: Behavior normal.                  Procedures:  Procedures      Medical Decision Making:      Comorbidities that affect care:    None    External Notes reviewed:          The following orders were placed and all results were independently analyzed by me:  No orders of the defined types were placed in this encounter.      Medications Given in the Emergency Department:  Medications - No data to display     ED Course:    The patient was initially evaluated in the triage area where orders were placed. The patient was later dispositioned by BRIA Pendleton.      The patient was advised to stay for completion of workup which includes but is not limited to communication of labs and radiological results, reassessment and plan. The patient was advised that leaving prior to disposition by a provider could result in critical findings that are not communicated to the patient.          Labs:    Lab Results (last 24 hours)     ** No results found for the last 24 hours. **           Imaging:    No Radiology Exams Resulted Within Past 24 Hours      Differential Diagnosis and Discussion:      Fever: Based on the complaint of fever, " differential diagnosis includes but is not limited to meningitis, pneumonia, pyelonephritis, acute uti,  systemic immune response syndrome, sepsis, viral syndrome, fungal infection, tick born illness and other bacterial infections.  Joint Pain: Differential diagnosis includes but is not limited to polyarticular arthritis, gout, tendinitis, hemarthrosis, septic arthritis, rheumatoid arthritis, bursitis, degenerative joint disease, joint effusion, autoimmune disorder, trauma, and occult neoplasm.        MDM         Patient Care Considerations:          Consultants/Shared Management Plan:        Social Determinants of Health:          Disposition and Care Coordination:    Eloped: This patient has left the emergency department or waiting room with no communication to myself, nursing or administrative staff. There was no opportunity to discuss the patient's decision to leave, provide medical advice or discuss alternatives to. The staff has made efforts to locate patient without success.        Final diagnoses:   None        ED Disposition     None          This medical record created using voice recognition software.           Gunjan Palmer, APRN  05/31/23 0002

## 2023-05-31 NOTE — ED TRIAGE NOTES
"Patient arrived to ED for body aches.  She states that over the last two months, she has had 6 tick bites.  \"I am worried because about 5-6 years ago, I had RMSF and I am scared that I have another tick disease.\"    Patient admits fever. \"I just don't feel good at all.\"  "

## 2023-06-01 LAB — B BURGDOR IGG+IGM SER QL IA: NEGATIVE

## 2023-06-05 LAB
A PHAGOCYTOPH DNA BLD QL NAA+PROBE: NEGATIVE
E CHAFFEENSIS DNA BLD QL NAA+PROBE: NEGATIVE

## 2023-06-06 LAB — RICKETTSIA RICKETTSII DNA, RT: NOT DETECTED

## 2023-10-18 ENCOUNTER — HOSPITAL ENCOUNTER (EMERGENCY)
Facility: HOSPITAL | Age: 68
Discharge: HOME | End: 2023-10-18
Attending: EMERGENCY MEDICINE
Payer: MEDICARE

## 2023-10-18 VITALS
WEIGHT: 120 LBS | DIASTOLIC BLOOD PRESSURE: 99 MMHG | SYSTOLIC BLOOD PRESSURE: 164 MMHG | HEART RATE: 78 BPM | OXYGEN SATURATION: 98 % | HEIGHT: 61 IN | RESPIRATION RATE: 18 BRPM | TEMPERATURE: 98.4 F | BODY MASS INDEX: 22.66 KG/M2

## 2023-10-18 DIAGNOSIS — N30.00 ACUTE CYSTITIS WITHOUT HEMATURIA: Primary | ICD-10-CM

## 2023-10-18 LAB

## 2023-10-18 PROCEDURE — 81001 URINALYSIS AUTO W/SCOPE: CPT | Performed by: NURSE PRACTITIONER

## 2023-10-18 PROCEDURE — 63710000001 ONDANSETRON ODT 4 MG TABLET DISPERSIBLE: Performed by: EMERGENCY MEDICINE

## 2023-10-18 PROCEDURE — 99283 EMERGENCY DEPT VISIT LOW MDM: CPT

## 2023-10-18 RX ORDER — PHENAZOPYRIDINE HYDROCHLORIDE 100 MG/1
200 TABLET, FILM COATED ORAL ONCE
Status: COMPLETED | OUTPATIENT
Start: 2023-10-18 | End: 2023-10-18

## 2023-10-18 RX ORDER — ONDANSETRON 4 MG/1
4 TABLET, ORALLY DISINTEGRATING ORAL ONCE
Status: COMPLETED | OUTPATIENT
Start: 2023-10-18 | End: 2023-10-18

## 2023-10-18 RX ORDER — NITROFURANTOIN 25; 75 MG/1; MG/1
100 CAPSULE ORAL ONCE
Status: COMPLETED | OUTPATIENT
Start: 2023-10-18 | End: 2023-10-18

## 2023-10-18 RX ORDER — PHENAZOPYRIDINE HYDROCHLORIDE 200 MG/1
200 TABLET, FILM COATED ORAL 3 TIMES DAILY
Qty: 6 TABLET | Refills: 0 | Status: SHIPPED | OUTPATIENT
Start: 2023-10-18 | End: 2023-10-20

## 2023-10-18 RX ORDER — NITROFURANTOIN 25; 75 MG/1; MG/1
100 CAPSULE ORAL 2 TIMES DAILY
Qty: 14 CAPSULE | Refills: 0 | Status: SHIPPED | OUTPATIENT
Start: 2023-10-18 | End: 2023-10-25

## 2023-10-18 RX ADMIN — NITROFURANTOIN MONOHYDRATE/MACROCRYSTALLINE 100 MG: 25; 75 CAPSULE ORAL at 20:35

## 2023-10-18 RX ADMIN — PHENAZOPYRIDINE 200 MG: 100 TABLET ORAL at 20:15

## 2023-10-18 RX ADMIN — ONDANSETRON 4 MG: 4 TABLET, ORALLY DISINTEGRATING ORAL at 20:50

## 2023-10-18 NOTE — ED PROVIDER NOTES
Time: 7:53 PM EDT  Date of encounter:  10/18/2023  Independent Historian/Clinical History and Information was obtained by:   Patient    History is limited by: N/A    Chief Complaint: Urinary frequency      History of Present Illness:  Patient is a 68 y.o. year old female who presents to the emergency department for evaluation of urinary frequency with burning and pain on urination since 530 today.  Patient states she is having pressure in her low abdomen and a little aching in her back with some dysuria that started today.  No known fever but has felt hot at times and occasionally nauseated but no vomiting.  Patient states her discomfort is about a 2 out of 10.  [place] when she has to go when she is going small frequent amounts.  Patient states she has not had a UTI in several years but this feels similar    HPI    Patient Care Team  Primary Care Provider: Ivan Clemente MD    Past Medical History:     Allergies   Allergen Reactions    Gabapentin Unknown - High Severity, Dizziness, GI Intolerance and Unknown (See Comments)     Other reaction(s): Dizzy       Past Medical History:   Diagnosis Date    Anemia     Berman's esophagus with low grade dysplasia     Cardiac murmur     Corns and callus     DM (diabetes mellitus)     Fatty liver disease, nonalcoholic     GERD (gastroesophageal reflux disease)     HBP (high blood pressure)     Heartburn     Hepatitis A 1971    Hiatal hernia     Hyperlipidemia     Renal stone      Past Surgical History:   Procedure Laterality Date    APPENDECTOMY      CHOLECYSTECTOMY      COLONOSCOPY  08/11/2016    DR MORAN    COLONOSCOPY  07/28/2020    ENDOSCOPY  09/15/2016    DR MORAN    ENDOSCOPY  04/04/2018    FOOT SURGERY      HERNIA REPAIR      HYSTERECTOMY      LAPAROSCOPIC TUBAL LIGATION      TUBAL ABDOMINAL LIGATION       Family History   Problem Relation Age of Onset    Breast cancer Mother 60    Diabetes Mother     Heart disease Mother     Heart attack Father     Heart disease  Father     Urolithiasis Sister     Lung cancer Brother 60    Colon cancer Neg Hx        Home Medications:  Prior to Admission medications    Medication Sig Start Date End Date Taking? Authorizing Provider   amLODIPine (NORVASC) 5 MG tablet  4/23/21   Aleks Carlson MD   ascorbic acid (VITAMIN C) 500 MG tablet 500 mg. 7/14/21   Aleks Carlson MD   aspirin 81 MG EC tablet Aspir-81 81 mg oral tablet,delayed release (DR/EC) take 1 tablet (81 mg) by oral route once daily   Active    Aleks Carlson MD   cloNIDine (CATAPRES) 0.2 MG tablet  4/23/21   Aleks Carlson MD   cyanocobalamin (VITAMIN B-12) 1000 MCG tablet 1,000 mcg. 7/14/21   Aleks Carlson MD   cyclobenzaprine (FLEXERIL) 10 MG tablet Take 10 mg by mouth 2 (Two) Times a Day. 4/6/22   Aleks Carlson MD   ezetimibe (ZETIA) 10 MG tablet Take 1 tablet by mouth Daily. 2/2/23   Aleks Carlson MD   Garlic-DHA-EPA (GARLIC/EPA PO) as directed    Aleks Carlson MD   hydrOXYzine pamoate (VISTARIL) 25 MG capsule  4/23/21   Aleks Carlson MD   metFORMIN (GLUCOPHAGE) 1000 MG tablet  4/23/21   Aleks Carlson MD   metoprolol tartrate (LOPRESSOR) 100 MG tablet  4/23/21   Aleks Carlson MD   multivitamin with minerals tablet tablet Take 1 tablet by mouth Daily.    Aleks Carlson MD   Omega-3 Fatty Acids (fish oil) 1200 MG capsule capsule Daily.    Aleks Carlson MD   simvastatin (ZOCOR) 20 MG tablet  4/23/21   Aleks Carlson MD   vitamin E 1000 UNIT capsule 450 mg. 7/14/21   Emergency, Nurse Epic, RN   vitamin E 400 UNIT capsule Daily.    Aleks Carlson MD   Zinc 50 MG tablet 1 tablet with food    Aleks Carlson MD   zinc gluconate 50 MG tablet   1 Tab, Oral, Daily, 0 Refill(s) 7/14/21   Emergency, Nurse Ashley, RN        Social History:   Social History     Tobacco Use    Smoking status: Former    Smokeless tobacco: Never   Vaping Use    Vaping Use: Never used   Substance  "Use Topics    Alcohol use: Not Currently     Comment: RARELY    Drug use: Never         Review of Systems:  Review of Systems   Constitutional:  Negative for chills and fever.   HENT:  Negative for congestion, ear pain and sore throat.    Eyes:  Negative for pain.   Respiratory:  Negative for cough, chest tightness and shortness of breath.    Cardiovascular:  Negative for chest pain.   Gastrointestinal:  Positive for abdominal pain and nausea. Negative for diarrhea and vomiting.   Genitourinary:  Positive for dysuria, frequency and urgency. Negative for flank pain and hematuria.   Musculoskeletal:  Positive for back pain. Negative for joint swelling.   Skin:  Negative for pallor.   Neurological:  Negative for seizures and headaches.   Hematological:  Negative for adenopathy.   Psychiatric/Behavioral: Negative.     All other systems reviewed and are negative.       Physical Exam:  /99 (BP Location: Left arm, Patient Position: Sitting)   Pulse 78   Temp 98.4 °F (36.9 °C)   Resp 18   Ht 154.9 cm (61\")   Wt 54.4 kg (120 lb)   SpO2 98%   BMI 22.67 kg/m²     Physical Exam  Vitals and nursing note reviewed.   Constitutional:       General: She is not in acute distress.     Appearance: Normal appearance. She is not toxic-appearing.   HENT:      Head: Normocephalic and atraumatic.      Nose: Nose normal.      Mouth/Throat:      Mouth: Mucous membranes are moist.   Eyes:      General: No scleral icterus.     Conjunctiva/sclera: Conjunctivae normal.   Cardiovascular:      Rate and Rhythm: Normal rate and regular rhythm.      Heart sounds: Normal heart sounds.   Pulmonary:      Effort: Pulmonary effort is normal. No respiratory distress.      Breath sounds: Normal breath sounds.   Abdominal:      General: Bowel sounds are normal.      Palpations: Abdomen is soft.      Tenderness: There is abdominal tenderness (Mild suprapubic). There is no right CVA tenderness or left CVA tenderness.   Musculoskeletal:         " General: Normal range of motion.      Cervical back: Normal range of motion and neck supple.   Skin:     General: Skin is warm and dry.   Neurological:      Mental Status: She is alert and oriented to person, place, and time.   Psychiatric:         Mood and Affect: Mood normal.         Behavior: Behavior normal.              Medical Decision Making:      Comorbidities that affect care:    Diabetes, Hypertension    External Notes reviewed:    Previous Labs: Checked patient's last lab work to check kidney function creatinine was 1.17 and BUN was 16 this was done on May 30, 2023      The following orders were placed and all results were independently analyzed by me:  Orders Placed This Encounter   Procedures    Urinalysis With Microscopic If Indicated (No Culture) - Urine, Clean Catch    Urinalysis, Microscopic Only - Urine, Clean Catch       Medications Given in the Emergency Department:  Medications   phenazopyridine (PYRIDIUM) tablet 200 mg (200 mg Oral Given 10/18/23 2015)   nitrofurantoin (macrocrystal-monohydrate) (MACROBID) capsule 100 mg (100 mg Oral Given 10/18/23 2035)   ondansetron ODT (ZOFRAN-ODT) disintegrating tablet 4 mg (4 mg Oral Given 10/18/23 2050)        ED Course:         Labs:    Lab Results (last 24 hours)       Procedure Component Value Units Date/Time    Urinalysis With Microscopic If Indicated (No Culture) - Urine, Clean Catch [[id number]]  (Abnormal) Collected: 10/18/23 2002    Specimen: Urine, Clean Catch Updated: 10/18/23 2013     Color, UA Yellow     Appearance, UA Clear     pH, UA 6.0     Specific Gravity, UA 1.006     Glucose, UA Negative     Ketones, UA Negative     Bilirubin, UA Negative     Blood, UA Negative     Protein, UA Negative     Leuk Esterase, UA Trace     Nitrite, UA Negative     Urobilinogen, UA 0.2 E.U./dL    Urinalysis, Microscopic Only - Urine, Clean Catch [[id number]]  (Abnormal) Collected: 10/18/23 2002    Specimen: Urine, Clean Catch Updated: 10/18/23 2021     RBC, UA  None Seen /HPF      WBC, UA 3-5 /HPF      Bacteria, UA Trace /HPF      Squamous Epithelial Cells, UA 0-2 /HPF      Hyaline Casts, UA None Seen /LPF      Methodology Manual Light Microscopy             Imaging:    No Radiology Exams Resulted Within Past 24 Hours      Differential Diagnosis and Discussion:    Dysuria: Differential diagnosis includes but is not limited to urethritis, cystitis, pyelonephritis, ureteral calculi, neoplasm, chemical irritant, urethral stricture, and trauma    All labs were reviewed and interpreted by me.    MDM  Number of Diagnoses or Management Options  Acute cystitis without hematuria  Diagnosis management comments: Based on the results of the patient´s urinalysis and urinary complaints, signs, symptoms, and diagnostic testing is consistent with a urinary tract infection. Patient is resting comfortably, is alert, and is in no distress. The repeat examination is unremarkable and benign. The patient has no signs of urosepsis. The patient was started on antibiotics in the emergency department and will be discharged with antibiotics as an outpatient. The patient was counseled to return to the ER for fever >100.5, intractable pain or vomiting, or any other concerns that the may have. The patient has expressed a clear and thorough understanding and agreed to follow up as instructed.         Amount and/or Complexity of Data Reviewed  Clinical lab tests: reviewed and ordered  Tests in the medicine section of CPT®: ordered and reviewed    Risk of Complications, Morbidity, and/or Mortality  Presenting problems: low  Diagnostic procedures: low  Management options: low    Patient Progress  Patient progress: stable           Patient Care Considerations:    CT ABDOMEN AND PELVIS: I considered ordering a CT scan of the abdomen and pelvis however patient has no significant signs of pain that would indicate a kidney stone or pyelonephritis      Consultants/Shared Management Plan:    None    Social  Determinants of Health:    Patient is independent, reliable, and has access to care.       Disposition and Care Coordination:    Discharged: The patient is suitable and stable for discharge with no need for consideration of observation or admission.    I have explained the patient´s condition, diagnoses and treatment plan based on the information available to me at this time. I have answered questions and addressed any concerns. The patient has a good  understanding of the patient´s diagnosis, condition, and treatment plan as can be expected at this point. The vital signs have been stable. The patient´s condition is stable and appropriate for discharge from the emergency department.      The patient will pursue further outpatient evaluation with the primary care physician or other designated or consulting physician as outlined in the discharge instructions. They are agreeable to this plan of care and follow-up instructions have been explained in detail. The patient has received these instructions in written format and have expressed an understanding of the discharge instructions. The patient is aware that any significant change in condition or worsening of symptoms should prompt an immediate return to this or the closest emergency department or call to 911.  I have explained discharge medications and the need for follow up with the patient/caretakers. This was also printed in the discharge instructions. Patient was discharged with the following medications and follow up:      Medication List        New Prescriptions      nitrofurantoin (macrocrystal-monohydrate) 100 MG capsule  Commonly known as: MACROBID  Take 1 capsule by mouth 2 (Two) Times a Day for 7 days.     phenazopyridine 200 MG tablet  Commonly known as: PYRIDIUM  Take 1 tablet by mouth 3 (Three) Times a Day for 2 days.               Where to Get Your Medications        These medications were sent to [facility] - [place], [place] - [address]  [facility] - [phone]  - [phone]  [address] [facility], [facility] [place] [address]      Phone: [phone]   nitrofurantoin (macrocrystal-monohydrate) 100 MG capsule  phenazopyridine 200 MG tablet      Ivan Clemente MD  [address] [place] [address] [phone]    In 3 days  if no improvement       Final diagnoses:   Acute cystitis without hematuria        ED Disposition       ED Disposition   Discharge    Condition   Stable    Comment   --               This medical record created using voice recognition software.             Norah Durham, APRN  10/18/23 2051

## 2023-10-19 NOTE — DISCHARGE INSTRUCTIONS
Drink plenty fluids.    Take medication as prescribed.  You got your first dose of antibiotic in the emergency department.    Follow-up with your PCP if you are not improving in the next 3 days.    Return to emergency department for any new or worsening symptoms

## 2024-04-02 ENCOUNTER — TRANSCRIBE ORDERS (OUTPATIENT)
Dept: ADMINISTRATIVE | Facility: HOSPITAL | Age: 69
End: 2024-04-02
Payer: MEDICARE

## 2024-04-02 DIAGNOSIS — Z12.31 VISIT FOR SCREENING MAMMOGRAM: Primary | ICD-10-CM

## 2024-04-09 ENCOUNTER — HOSPITAL ENCOUNTER (OUTPATIENT)
Dept: MAMMOGRAPHY | Facility: HOSPITAL | Age: 69
Discharge: HOME | End: 2024-04-09
Admitting: INTERNAL MEDICINE
Payer: MEDICARE

## 2024-04-09 DIAGNOSIS — Z12.31 VISIT FOR SCREENING MAMMOGRAM: ICD-10-CM

## 2024-04-09 PROCEDURE — 77063 BREAST TOMOSYNTHESIS BI: CPT

## 2024-04-09 PROCEDURE — 77067 SCR MAMMO BI INCL CAD: CPT

## 2024-06-16 ENCOUNTER — HOSPITAL ENCOUNTER (INPATIENT)
Facility: HOSPITAL | Age: 69
LOS: 3 days | Discharge: HOME | End: 2024-06-19
Attending: EMERGENCY MEDICINE | Admitting: STUDENT IN AN ORGANIZED HEALTH CARE EDUCATION/TRAINING PROGRAM
Payer: MEDICARE

## 2024-06-16 ENCOUNTER — APPOINTMENT (OUTPATIENT)
Dept: CT IMAGING | Facility: HOSPITAL | Age: 69
End: 2024-06-16
Payer: MEDICARE

## 2024-06-16 DIAGNOSIS — E86.0 DEHYDRATION WITH HYPONATREMIA: ICD-10-CM

## 2024-06-16 DIAGNOSIS — R10.13 ABDOMINAL PAIN, ACUTE, EPIGASTRIC: Primary | ICD-10-CM

## 2024-06-16 DIAGNOSIS — E87.1 DEHYDRATION WITH HYPONATREMIA: ICD-10-CM

## 2024-06-16 DIAGNOSIS — R11.2 NAUSEA AND VOMITING, UNSPECIFIED VOMITING TYPE: ICD-10-CM

## 2024-06-16 DIAGNOSIS — E87.20 LACTIC ACIDOSIS: ICD-10-CM

## 2024-06-16 DIAGNOSIS — Z98.890 HISTORY OF NISSEN FUNDOPLICATION: ICD-10-CM

## 2024-06-16 LAB
ALBUMIN SERPL-MCNC: 4.6 G/DL (ref 3.5–5.2)
ALBUMIN/GLOB SERPL: 1.7 G/DL
ALP SERPL-CCNC: 38 U/L (ref 39–117)
ALT SERPL W P-5'-P-CCNC: 10 U/L (ref 1–33)
ANION GAP SERPL CALCULATED.3IONS-SCNC: 15.5 MMOL/L (ref 5–15)
AST SERPL-CCNC: 14 U/L (ref 1–32)
BACTERIA UR QL AUTO: NORMAL /HPF
BASOPHILS # BLD AUTO: 0.04 10*3/MM3 (ref 0–0.2)
BASOPHILS NFR BLD AUTO: 0.6 % (ref 0–1.5)
BILIRUB SERPL-MCNC: 0.6 MG/DL (ref 0–1.2)
BILIRUB UR QL STRIP: NEGATIVE
BUN SERPL-MCNC: 18 MG/DL (ref 8–23)
BUN/CREAT SERPL: 14.6 (ref 7–25)
CALCIUM SPEC-SCNC: 9.9 MG/DL (ref 8.6–10.5)
CHLORIDE SERPL-SCNC: 92 MMOL/L (ref 98–107)
CLARITY UR: CLEAR
CO2 SERPL-SCNC: 23.5 MMOL/L (ref 22–29)
COLOR UR: YELLOW
CREAT SERPL-MCNC: 1.23 MG/DL (ref 0.57–1)
D-LACTATE SERPL-SCNC: 1.3 MMOL/L (ref 0.5–2)
D-LACTATE SERPL-SCNC: 2.7 MMOL/L (ref 0.5–2)
D-LACTATE SERPL-SCNC: 3.4 MMOL/L (ref 0.5–2)
DEPRECATED RDW RBC AUTO: 38.5 FL (ref 37–54)
EGFRCR SERPLBLD CKD-EPI 2021: 47.1 ML/MIN/1.73
EOSINOPHIL # BLD AUTO: 0.07 10*3/MM3 (ref 0–0.4)
EOSINOPHIL NFR BLD AUTO: 1 % (ref 0.3–6.2)
ERYTHROCYTE [DISTWIDTH] IN BLOOD BY AUTOMATED COUNT: 12.1 % (ref 12.3–15.4)
GLOBULIN UR ELPH-MCNC: 2.7 GM/DL
GLUCOSE SERPL-MCNC: 128 MG/DL (ref 65–99)
GLUCOSE UR STRIP-MCNC: NEGATIVE MG/DL
HCT VFR BLD AUTO: 38.3 % (ref 34–46.6)
HGB BLD-MCNC: 13.3 G/DL (ref 12–15.9)
HGB UR QL STRIP.AUTO: NEGATIVE
HOLD SPECIMEN: NORMAL
HOLD SPECIMEN: NORMAL
HYALINE CASTS UR QL AUTO: NORMAL /LPF
IMM GRANULOCYTES # BLD AUTO: 0 10*3/MM3 (ref 0–0.05)
IMM GRANULOCYTES NFR BLD AUTO: 0 % (ref 0–0.5)
KETONES UR QL STRIP: ABNORMAL
LEUKOCYTE ESTERASE UR QL STRIP.AUTO: ABNORMAL
LIPASE SERPL-CCNC: 36 U/L (ref 13–60)
LYMPHOCYTES # BLD AUTO: 2.43 10*3/MM3 (ref 0.7–3.1)
LYMPHOCYTES NFR BLD AUTO: 34.7 % (ref 19.6–45.3)
MAGNESIUM SERPL-MCNC: 1.8 MG/DL (ref 1.6–2.4)
MCH RBC QN AUTO: 30 PG (ref 26.6–33)
MCHC RBC AUTO-ENTMCNC: 34.7 G/DL (ref 31.5–35.7)
MCV RBC AUTO: 86.5 FL (ref 79–97)
MONOCYTES # BLD AUTO: 0.57 10*3/MM3 (ref 0.1–0.9)
MONOCYTES NFR BLD AUTO: 8.1 % (ref 5–12)
NEUTROPHILS NFR BLD AUTO: 3.9 10*3/MM3 (ref 1.7–7)
NEUTROPHILS NFR BLD AUTO: 55.6 % (ref 42.7–76)
NITRITE UR QL STRIP: NEGATIVE
NRBC BLD AUTO-RTO: 0 /100 WBC (ref 0–0.2)
PH UR STRIP.AUTO: 6.5 [PH] (ref 5–8)
PLATELET # BLD AUTO: 346 10*3/MM3 (ref 140–450)
PMV BLD AUTO: 9 FL (ref 6–12)
POTASSIUM SERPL-SCNC: 4.1 MMOL/L (ref 3.5–5.2)
PROT SERPL-MCNC: 7.3 G/DL (ref 6–8.5)
PROT UR QL STRIP: NEGATIVE
RBC # BLD AUTO: 4.43 10*6/MM3 (ref 3.77–5.28)
RBC # UR STRIP: NORMAL /HPF
REF LAB TEST METHOD: NORMAL
SODIUM SERPL-SCNC: 131 MMOL/L (ref 136–145)
SP GR UR STRIP: 1.01 (ref 1–1.03)
SQUAMOUS #/AREA URNS HPF: NORMAL /HPF
UROBILINOGEN UR QL STRIP: ABNORMAL
WBC # UR STRIP: NORMAL /HPF
WBC NRBC COR # BLD AUTO: 7.01 10*3/MM3 (ref 3.4–10.8)
WHOLE BLOOD HOLD COAG: NORMAL
WHOLE BLOOD HOLD SPECIMEN: NORMAL

## 2024-06-16 PROCEDURE — 99285 EMERGENCY DEPT VISIT HI MDM: CPT

## 2024-06-16 PROCEDURE — 80053 COMPREHEN METABOLIC PANEL: CPT

## 2024-06-16 PROCEDURE — 25810000003 SODIUM CHLORIDE 0.9 % SOLUTION: Performed by: EMERGENCY MEDICINE

## 2024-06-16 PROCEDURE — 25510000001 IOPAMIDOL PER 1 ML: Performed by: EMERGENCY MEDICINE

## 2024-06-16 PROCEDURE — 25010000002 MORPHINE PER 10 MG: Performed by: EMERGENCY MEDICINE

## 2024-06-16 PROCEDURE — 25010000002 ONDANSETRON PER 1 MG: Performed by: EMERGENCY MEDICINE

## 2024-06-16 PROCEDURE — 85025 COMPLETE CBC W/AUTO DIFF WBC: CPT

## 2024-06-16 PROCEDURE — 74177 CT ABD & PELVIS W/CONTRAST: CPT

## 2024-06-16 PROCEDURE — 83690 ASSAY OF LIPASE: CPT

## 2024-06-16 PROCEDURE — 36415 COLL VENOUS BLD VENIPUNCTURE: CPT

## 2024-06-16 PROCEDURE — 83735 ASSAY OF MAGNESIUM: CPT | Performed by: EMERGENCY MEDICINE

## 2024-06-16 PROCEDURE — 83605 ASSAY OF LACTIC ACID: CPT

## 2024-06-16 PROCEDURE — 81001 URINALYSIS AUTO W/SCOPE: CPT

## 2024-06-16 RX ORDER — SODIUM CHLORIDE 0.9 % (FLUSH) 0.9 %
10 SYRINGE (ML) INJECTION AS NEEDED
Status: DISCONTINUED | OUTPATIENT
Start: 2024-06-16 | End: 2024-06-19 | Stop reason: HOSPADM

## 2024-06-16 RX ORDER — CLONIDINE HYDROCHLORIDE 0.1 MG/1
0.2 TABLET ORAL NIGHTLY
Status: DISCONTINUED | OUTPATIENT
Start: 2024-06-16 | End: 2024-06-19 | Stop reason: HOSPADM

## 2024-06-16 RX ORDER — POLYETHYLENE GLYCOL 3350 17 G/17G
17 POWDER, FOR SOLUTION ORAL DAILY PRN
Status: DISCONTINUED | OUTPATIENT
Start: 2024-06-16 | End: 2024-06-19 | Stop reason: HOSPADM

## 2024-06-16 RX ORDER — ENOXAPARIN SODIUM 100 MG/ML
30 INJECTION SUBCUTANEOUS DAILY
Status: DISCONTINUED | OUTPATIENT
Start: 2024-06-16 | End: 2024-06-19 | Stop reason: HOSPADM

## 2024-06-16 RX ORDER — NALOXONE HCL 0.4 MG/ML
0.4 VIAL (ML) INJECTION
Status: DISCONTINUED | OUTPATIENT
Start: 2024-06-16 | End: 2024-06-19 | Stop reason: HOSPADM

## 2024-06-16 RX ORDER — ALUMINA, MAGNESIA, AND SIMETHICONE 2400; 2400; 240 MG/30ML; MG/30ML; MG/30ML
15 SUSPENSION ORAL EVERY 6 HOURS PRN
Status: DISCONTINUED | OUTPATIENT
Start: 2024-06-16 | End: 2024-06-19 | Stop reason: HOSPADM

## 2024-06-16 RX ORDER — PANTOPRAZOLE SODIUM 40 MG/10ML
40 INJECTION, POWDER, LYOPHILIZED, FOR SOLUTION INTRAVENOUS ONCE
Status: COMPLETED | OUTPATIENT
Start: 2024-06-16 | End: 2024-06-16

## 2024-06-16 RX ORDER — ALPRAZOLAM 0.25 MG/1
0.5 TABLET ORAL EVERY 8 HOURS PRN
Status: DISCONTINUED | OUTPATIENT
Start: 2024-06-16 | End: 2024-06-19 | Stop reason: HOSPADM

## 2024-06-16 RX ORDER — ONDANSETRON 2 MG/ML
4 INJECTION INTRAMUSCULAR; INTRAVENOUS EVERY 6 HOURS PRN
Status: DISCONTINUED | OUTPATIENT
Start: 2024-06-16 | End: 2024-06-19 | Stop reason: HOSPADM

## 2024-06-16 RX ORDER — ACETAMINOPHEN 160 MG/5ML
650 SOLUTION ORAL EVERY 4 HOURS PRN
Status: DISCONTINUED | OUTPATIENT
Start: 2024-06-16 | End: 2024-06-19 | Stop reason: HOSPADM

## 2024-06-16 RX ORDER — METOPROLOL TARTRATE 50 MG/1
100 TABLET, FILM COATED ORAL 2 TIMES DAILY
Status: DISCONTINUED | OUTPATIENT
Start: 2024-06-16 | End: 2024-06-19 | Stop reason: HOSPADM

## 2024-06-16 RX ORDER — AMOXICILLIN 250 MG
2 CAPSULE ORAL 2 TIMES DAILY PRN
Status: DISCONTINUED | OUTPATIENT
Start: 2024-06-16 | End: 2024-06-19 | Stop reason: HOSPADM

## 2024-06-16 RX ORDER — ONDANSETRON 2 MG/ML
4 INJECTION INTRAMUSCULAR; INTRAVENOUS ONCE
Status: COMPLETED | OUTPATIENT
Start: 2024-06-16 | End: 2024-06-16

## 2024-06-16 RX ORDER — ACETAMINOPHEN 650 MG/1
650 SUPPOSITORY RECTAL EVERY 4 HOURS PRN
Status: DISCONTINUED | OUTPATIENT
Start: 2024-06-16 | End: 2024-06-19 | Stop reason: HOSPADM

## 2024-06-16 RX ORDER — BISACODYL 10 MG
10 SUPPOSITORY, RECTAL RECTAL DAILY PRN
Status: DISCONTINUED | OUTPATIENT
Start: 2024-06-16 | End: 2024-06-19 | Stop reason: HOSPADM

## 2024-06-16 RX ORDER — HYDROCODONE BITARTRATE AND ACETAMINOPHEN 5; 325 MG/1; MG/1
1 TABLET ORAL EVERY 4 HOURS PRN
Status: DISCONTINUED | OUTPATIENT
Start: 2024-06-16 | End: 2024-06-19 | Stop reason: HOSPADM

## 2024-06-16 RX ORDER — BISACODYL 5 MG/1
5 TABLET, DELAYED RELEASE ORAL DAILY PRN
Status: DISCONTINUED | OUTPATIENT
Start: 2024-06-16 | End: 2024-06-19 | Stop reason: HOSPADM

## 2024-06-16 RX ORDER — UREA 10 %
5 LOTION (ML) TOPICAL NIGHTLY PRN
Status: DISCONTINUED | OUTPATIENT
Start: 2024-06-16 | End: 2024-06-19 | Stop reason: HOSPADM

## 2024-06-16 RX ORDER — MORPHINE SULFATE 2 MG/ML
2 INJECTION, SOLUTION INTRAMUSCULAR; INTRAVENOUS ONCE
Status: COMPLETED | OUTPATIENT
Start: 2024-06-16 | End: 2024-06-16

## 2024-06-16 RX ORDER — MELATONIN
1000 DAILY
COMMUNITY

## 2024-06-16 RX ORDER — HYDROCODONE BITARTRATE AND ACETAMINOPHEN 10; 325 MG/1; MG/1
1 TABLET ORAL EVERY 4 HOURS PRN
Status: DISCONTINUED | OUTPATIENT
Start: 2024-06-16 | End: 2024-06-19 | Stop reason: HOSPADM

## 2024-06-16 RX ORDER — ACETAMINOPHEN 325 MG/1
650 TABLET ORAL EVERY 4 HOURS PRN
Status: DISCONTINUED | OUTPATIENT
Start: 2024-06-16 | End: 2024-06-19 | Stop reason: HOSPADM

## 2024-06-16 RX ADMIN — CLONIDINE HYDROCHLORIDE 0.2 MG: 0.1 TABLET ORAL at 21:23

## 2024-06-16 RX ADMIN — PANTOPRAZOLE SODIUM 40 MG: 40 INJECTION, POWDER, FOR SOLUTION INTRAVENOUS at 16:39

## 2024-06-16 RX ADMIN — METOPROLOL TARTRATE 100 MG: 50 TABLET, FILM COATED ORAL at 21:23

## 2024-06-16 RX ADMIN — SODIUM CHLORIDE 1000 ML: 9 INJECTION, SOLUTION INTRAVENOUS at 11:42

## 2024-06-16 RX ADMIN — SENNOSIDES AND DOCUSATE SODIUM 2 TABLET: 50; 8.6 TABLET ORAL at 21:33

## 2024-06-16 RX ADMIN — IOPAMIDOL 100 ML: 755 INJECTION, SOLUTION INTRAVENOUS at 13:41

## 2024-06-16 RX ADMIN — MORPHINE SULFATE 2 MG: 2 INJECTION, SOLUTION INTRAMUSCULAR; INTRAVENOUS at 12:05

## 2024-06-16 RX ADMIN — ONDANSETRON 4 MG: 2 INJECTION INTRAMUSCULAR; INTRAVENOUS at 11:42

## 2024-06-16 NOTE — ED PROVIDER NOTES
Time: 11:38 AM EDT  Date of encounter:  6/16/2024  Independent Historian/Clinical History and Information was obtained by:   Patient    History is limited by: N/A    Chief Complaint: Upper abdominal pain and vomiting for 2 weeks          History of Present Illness:  Patient is a 69 y.o. year old diabetic female with previous history of GERD and hiatal hernia status post Nissen fundoplication who presents to the emergency department for evaluation of nausea and vomiting and dry heaving for the past couple of weeks with upper abdominal or epigastric pain.    She has not had a bowel movement in 5 days and does occasionally get constipated.    Today she noticed some mild dysuria.  Denies any fevers or chills.    She is already status post cholecystectomy and appendectomy.    HPI    Patient Care Team  Primary Care Provider: Ivan Clemente MD    Past Medical History:     Allergies   Allergen Reactions    Gabapentin Unknown - High Severity, Dizziness, GI Intolerance and Unknown (See Comments)     Other reaction(s): Dizzy       Past Medical History:   Diagnosis Date    Anemia     Berman's esophagus with low grade dysplasia     Cardiac murmur     Corns and callus     DM (diabetes mellitus)     Fatty liver disease, nonalcoholic     GERD (gastroesophageal reflux disease)     HBP (high blood pressure)     Heartburn     Hepatitis A 1971    Hiatal hernia     Hyperlipidemia     Renal stone      Past Surgical History:   Procedure Laterality Date    APPENDECTOMY      CHOLECYSTECTOMY      COLONOSCOPY  08/11/2016    DR MORAN    COLONOSCOPY  07/28/2020    ENDOSCOPY  09/15/2016    DR MORAN    ENDOSCOPY  04/04/2018    FOOT SURGERY      HERNIA REPAIR      HYSTERECTOMY      LAPAROSCOPIC TUBAL LIGATION      TUBAL ABDOMINAL LIGATION       Family History   Problem Relation Age of Onset    Breast cancer Mother 60    Diabetes Mother     Heart disease Mother     Heart attack Father     Heart disease Father     Urolithiasis Sister      Lung cancer Brother 60    Colon cancer Neg Hx        Home Medications:  Prior to Admission medications    Medication Sig Start Date End Date Taking? Authorizing Provider   amLODIPine (NORVASC) 5 MG tablet  4/23/21   Aleks Carlson MD   ascorbic acid (VITAMIN C) 500 MG tablet 500 mg. 7/14/21   Aleks Carlson MD   aspirin 81 MG EC tablet Aspir-81 81 mg oral tablet,delayed release (DR/EC) take 1 tablet (81 mg) by oral route once daily   Active    Aleks Carlson MD   cloNIDine (CATAPRES) 0.2 MG tablet  4/23/21   Aleks Carlson MD   cyanocobalamin (VITAMIN B-12) 1000 MCG tablet 1,000 mcg. 7/14/21   Aleks Carlson MD   cyclobenzaprine (FLEXERIL) 10 MG tablet Take 10 mg by mouth 2 (Two) Times a Day. 4/6/22   Aleks Carlson MD   ezetimibe (ZETIA) 10 MG tablet Take 1 tablet by mouth Daily. 2/2/23   Aleks Carlson MD   Garlic-DHA-EPA (GARLIC/EPA PO) as directed    Aleks Carlson MD   hydrOXYzine pamoate (VISTARIL) 25 MG capsule  4/23/21   Aleks Carlson MD   metFORMIN (GLUCOPHAGE) 1000 MG tablet  4/23/21   Aleks Carlson MD   metoprolol tartrate (LOPRESSOR) 100 MG tablet  4/23/21   Aleks Carlson MD   multivitamin with minerals tablet tablet Take 1 tablet by mouth Daily.    Aleks Carlson MD   Omega-3 Fatty Acids (fish oil) 1200 MG capsule capsule Daily.    Aleks Carlson MD   simvastatin (ZOCOR) 20 MG tablet  4/23/21   Aleks Carlson MD   vitamin E 1000 UNIT capsule 450 mg. 7/14/21   Emergency, Nurse Epic, RN   vitamin E 400 UNIT capsule Daily.    Aleks Carlson MD   Zinc 50 MG tablet 1 tablet with food    Aleks Carlson MD   zinc gluconate 50 MG tablet   1 Tab, Oral, Daily, 0 Refill(s) 7/14/21   Emergency, Nurse Ashley, RN        Social History:   Social History     Tobacco Use    Smoking status: Former    Smokeless tobacco: Never   Vaping Use    Vaping status: Never Used   Substance Use Topics    Alcohol use: Not  "Currently     Comment: RARELY    Drug use: Never         Review of Systems:  Review of Systems   I performed a 10 point review of systems which was all negative, except for the positives found in the HPI above.  Physical Exam:  /94 (BP Location: Left arm, Patient Position: Sitting)   Pulse 83   Temp 97.6 °F (36.4 °C) (Oral)   Resp 18   Ht 154.9 cm (61\")   Wt 52.8 kg (116 lb 6.5 oz)   SpO2 94%   BMI 21.99 kg/m²     Physical Exam   General: Awake alert and in mild to moderate distress, holding emesis bag    HEENT: Head normocephalic atraumatic, eyes PERRLA EOMI, nose normal, oropharynx normal.  Mildly dry mucous membranes.    Neck: Supple full range of motion, no meningismus, no lymphadenopathy    Heart: Regular rate and rhythm, no murmurs or rubs, 2+ radial pulses bilaterally    Lungs: Clear to auscultation bilaterally without wheezes or crackles, no respiratory distress    Abdomen: Soft, moderately tender throughout the epigastrium only, no rebound or guarding    Skin: Warm, dry, no rash    Musculoskeletal: Normal range of motion, no lower extremity edema    Neurologic: Oriented x3, no motor deficits no sensory deficits    Psychiatric: Mood appears stable, no psychosis          Procedures:  Procedures      Medical Decision Making:      Comorbidities that affect care:    History of hiatal hernia and GERD and Nissen fundoplication    External Notes reviewed:    None      The following orders were placed and all results were independently analyzed by me:  Orders Placed This Encounter   Procedures    CT Abdomen Pelvis With Contrast    Edward Draw    Comprehensive Metabolic Panel    Lipase    Urinalysis With Microscopic If Indicated (No Culture) - Urine, Clean Catch    Lactic Acid, Plasma    CBC Auto Differential    STAT Lactic Acid, Reflex    Magnesium    Urinalysis, Microscopic Only - Urine, Clean Catch    STAT Lactic Acid, Reflex    NPO Diet NPO Type: Strict NPO    Undress & Gown    Nephrology  (on-call " MD unless specified)    Insert Peripheral IV    Inpatient Admission    CBC & Differential    Green Top (Gel)    Lavender Top    Gold Top - SST    Light Blue Top       Medications Given in the Emergency Department:  Medications   sodium chloride 0.9 % flush 10 mL (has no administration in time range)   pantoprazole (PROTONIX) injection 40 mg (has no administration in time range)   morphine injection 2 mg (2 mg Intravenous Given 6/16/24 1205)   ondansetron (ZOFRAN) injection 4 mg (4 mg Intravenous Given 6/16/24 1142)   sodium chloride 0.9 % bolus 1,000 mL (0 mL Intravenous Stopped 6/16/24 1411)   iopamidol (ISOVUE-370) 76 % injection 100 mL (100 mL Intravenous Given 6/16/24 1341)        ED Course:         Labs:    Lab Results (last 24 hours)       Procedure Component Value Units Date/Time    CBC & Differential [[id number]]  (Abnormal) Collected: 06/16/24 1102    Specimen: Blood Updated: 06/16/24 1105    Narrative:      The following orders were created for panel order CBC & Differential.  Procedure                               Abnormality         Status                     ---------                               -----------         ------                     CBC Auto Differential[[id number]]        Abnormal            Final result                 Please view results for these tests on the individual orders.    Lactic Acid, Plasma [[id number]]  (Abnormal) Collected: 06/16/24 1102    Specimen: Blood Updated: 06/16/24 1139     Lactate 3.4 mmol/L     CBC Auto Differential [[id number]]  (Abnormal) Collected: 06/16/24 1102    Specimen: Blood Updated: 06/16/24 1105     WBC 7.01 10*3/mm3      RBC 4.43 10*6/mm3      Hemoglobin 13.3 g/dL      Hematocrit 38.3 %      MCV 86.5 fL      MCH 30.0 pg      MCHC 34.7 g/dL      RDW 12.1 %      RDW-SD 38.5 fl      MPV 9.0 fL      Platelets 346 10*3/mm3      Neutrophil % 55.6 %      Lymphocyte % 34.7 %      Monocyte % 8.1 %      Eosinophil % 1.0 %      Basophil % 0.6 %      Immature Grans %  0.0 %      Neutrophils, Absolute 3.90 10*3/mm3      Lymphocytes, Absolute 2.43 10*3/mm3      Monocytes, Absolute 0.57 10*3/mm3      Eosinophils, Absolute 0.07 10*3/mm3      Basophils, Absolute 0.04 10*3/mm3      Immature Grans, Absolute 0.00 10*3/mm3      nRBC 0.0 /100 WBC     Comprehensive Metabolic Panel [[id number]]  (Abnormal) Collected: 06/16/24 1137    Specimen: Blood Updated: 06/16/24 1201     Glucose 128 mg/dL      BUN 18 mg/dL      Creatinine 1.23 mg/dL      Sodium 131 mmol/L      Potassium 4.1 mmol/L      Chloride 92 mmol/L      CO2 23.5 mmol/L      Calcium 9.9 mg/dL      Total Protein 7.3 g/dL      Albumin 4.6 g/dL      ALT (SGPT) 10 U/L      AST (SGOT) 14 U/L      Alkaline Phosphatase 38 U/L      Total Bilirubin 0.6 mg/dL      Globulin 2.7 gm/dL      A/G Ratio 1.7 g/dL      BUN/Creatinine Ratio 14.6     Anion Gap 15.5 mmol/L      eGFR 47.1 mL/min/1.73     Narrative:      GFR Normal >60  Chronic Kidney Disease <60  Kidney Failure <15    The GFR formula is only valid for adults with stable renal function between ages 18 and 70.    Lipase [[id number]]  (Normal) Collected: 06/16/24 1137    Specimen: Blood Updated: 06/16/24 1201     Lipase 36 U/L     Magnesium [[id number]]  (Normal) Collected: 06/16/24 1137    Specimen: Blood Updated: 06/16/24 1201     Magnesium 1.8 mg/dL     STAT Lactic Acid, Reflex [[id number]]  (Abnormal) Collected: 06/16/24 1409    Specimen: Blood Updated: 06/16/24 1515     Lactate 2.7 mmol/L     Urinalysis With Microscopic If Indicated (No Culture) - Urine, Clean Catch [[id number]]  (Abnormal) Collected: 06/16/24 1410    Specimen: Urine, Clean Catch Updated: 06/16/24 1429     Color, UA Yellow     Appearance, UA Clear     pH, UA 6.5     Specific Gravity, UA 1.015     Glucose, UA Negative     Ketones, UA Trace     Bilirubin, UA Negative     Blood, UA Negative     Protein, UA Negative     Leuk Esterase, UA Small (1+)     Nitrite, UA Negative     Urobilinogen, UA 0.2 E.U./dL    Urinalysis,  Microscopic Only - Urine, Clean Catch [[id number]] Collected: 06/16/24 1410    Specimen: Urine, Clean Catch Updated: 06/16/24 1429     RBC, UA 0-2 /HPF      WBC, UA 0-2 /HPF      Bacteria, UA None Seen /HPF      Squamous Epithelial Cells, UA 0-2 /HPF      Hyaline Casts, UA None Seen /LPF      Methodology Automated Microscopy             Imaging:    CT Abdomen Pelvis With Contrast    Result Date: 6/16/2024  CT ABDOMEN PELVIS W CONTRAST Date of Exam: 6/16/2024 1:38 PM EDT Indication: upper abd pain, N/V; hx of Nissen fundoplication Comparison: Abdominal ultrasound 10/8/2020. No previous CT for comparison. Technique: Axial CT images were obtained of the abdomen and pelvis after the uneventful intravenous administration of iodinated contrast. Reconstructed coronal and sagittal images were also obtained. Automated exposure control and iterative construction methods were used. Findings: 4 mm subpleural nodule in the lingula on axial image 14. Lung bases are otherwise clear. Gallbladder is surgically absent. Mild biliary ductal dilatation is noted, which can be seen status post cholecystectomy. Right extrarenal pelvis is noted. No nephroureterolithiasis on noncontrast portion of the CT. Subcentimeter right renal hypodense lesions are too small to be characterize, but statistically represent cysts. Left kidney, adrenal glands, pancreas, spleen, liver appear unremarkable. Postoperative changes at the gastroesophageal junction are consistent with history of Nissen fundoplication. No abnormal small bowel distention is seen. Appendix is not visualized. There is stool throughout the colon. There are scattered colonic diverticula without CT findings of diverticulitis. No urinary bladder wall thickening is seen. Uterus is not visualized. There is no significant free fluid or lymphadenopathy. Atherosclerotic vascular calcifications are noted. Calcifications are noted in  the ovarian veins. No acute osseous abnormality is identified.  There is degenerative facet arthropathy in the lower lumbar spine.     Impression: 1.No acute abdominal or pelvic abnormality is identified. 2.4 mm subpleural nodule in the lingula. Indeterminate solid pulmonary nodule measuring 4 mm. In a low-risk patient with a solid nodule <6 mm, recommend no follow-up. In a high-risk patient, CT at 12 months is optional with stronger consideration if there is suspicious nodule morphology and/or upper lobe location. Electronically Signed: Jeannine Snyder  6/16/2024 2:08 PM EDT  Workstation ID: [id number]       Differential Diagnosis and Discussion:    Abdominal Pain: Based on the patient's signs and symptoms, I considered abdominal aortic aneurysm, small bowel obstruction, pancreatitis, acute cholecystitis, acute appendecitis, peptic ulcer disease, gastritis, colitis, endocrine disorders, irritable bowel syndrome and other differential diagnosis an etiology of the patient's abdominal pain.  Vomiting: Differential diagnosis includes but is not limited to migraine, labyrinthine disorders, psychogenic, metabolic and endocrine causes, peptic ulcer, gastric outlet obstruction, gastritis, gastroenteritis, appendicitis, intestinal obstruction, paralytic ileus, food poisoning, cholecystitis, acute hepatitis, acute pancreatitis, acute febrile illness, and myocardial infarction.    All labs were reviewed and interpreted by me.  CT scan radiology impression was interpreted by me.    MDM     Amount and/or Complexity of Data Reviewed  Clinical lab tests: reviewed  Tests in the radiology section of CPT®: reviewed  Decide to obtain previous medical records or to obtain history from someone other than the patient: yes           This patient is a 69-year-old female with previous history of hiatal hernia and Nissen fundoplication now presenting with nausea and vomiting and epigastric abdominal pain for 2 weeks.    I am giving her some IV pain and nausea meds for symptom relief and hydrating with  fluids and checking lab work to look for dehydration electrolyte abnormalities.    I am getting CT scan of the abdomen pelvis to look for bowel obstruction.    CT came back showing no obstruction or acute findings other than some constipation.    Patient's lab work notable for elevated lactic acid of 3.7 and we are hydrating her and giving her antiemetics.    Blood work also notable for low sodium and chloride levels and mild chronic renal disease.    She also reports no appetite and nausea and vomiting and actual weight loss for the past several weeks.    I talked her about possibly going home on antiemetics to follow-up, but family is concerned about her trying to get to the bottom of the causative etiology because this has been going on for a few weeks and not getting any better.            Patient Care Considerations:          Consultants/Shared Management Plan:        Social Determinants of Health:    Patient is independent, reliable, and has access to care.       Disposition and Care Coordination:    Discharged: The patient is suitable and stable for discharge with no need for consideration of admission.    I have explained the patient´s condition, diagnoses and treatment plan based on the information available to me at this time. I have answered questions and addressed any concerns. The patient has a good  understanding of the patient´s diagnosis, condition, and treatment plan as can be expected at this point. The vital signs have been stable. The patient´s condition is stable and appropriate for discharge from the emergency department.      The patient will pursue further outpatient evaluation with the primary care physician or other designated or consulting physician as outlined in the discharge instructions. They are agreeable to this plan of care and follow-up instructions have been explained in detail. The patient has received these instructions in written format and has expressed an understanding of the  discharge instructions. The patient is aware that any significant change in condition or worsening of symptoms should prompt an immediate return to this or the closest emergency department or call to 911.  I have explained discharge medications and the need for follow up with the patient/caretakers. This was also printed in the discharge instructions. Patient was discharged with the following medications and follow up:      Medication List      No changes were made to your prescriptions during this visit.      No follow-up provider specified.     Final diagnoses:   Abdominal pain, acute, epigastric   Nausea and vomiting, unspecified vomiting type   History of Nissen fundoplication   Dehydration with hyponatremia   Lactic acidosis        ED Disposition       ED Disposition   Decision to Admit    Condition   --    Comment   Level of Care: Med/Surg [1]   Diagnosis: Dehydration with hyponatremia [502977]   Admitting Physician: SORAYA KOEHLER [[id number]]   Attending Physician: SORAYA KOEHLER [[id number]]   Isolate for COVID?: No [0]   Certification: I Certify That Inpatient Hospital Services Are Medically Necessary For Greater Than 2 Midnights                 This medical record created using voice recognition software.             Edgar Morocho MD  06/16/24 [id number]

## 2024-06-16 NOTE — H&P
[facility]   HISTORY AND PHYSICAL    Patient Name: Debra Barnett  : 1955  MRN: [id number]  Primary Care Physician:  Ivan Clemente MD  Date of admission: 2024    Subjective   Subjective     Chief Complaint: Persistent nausea    HPI:    Debra Barnett is a 69 y.o. female with past medical history significant for Berman's esophagitis, diabetes mellitus hyperlipidemia kidney stone came to the emergency room complaining of persistent nausea and dry heaving for the last 2 weeks associated with abdominal pain.  She says her urine output has also decreased and also constipated  CT abdomen pelvis unremarkable  Review of Systems  All review of systems negative except as in subjective complaints:  Personal History     Past Medical History:   Diagnosis Date    Anemia     Berman's esophagus with low grade dysplasia     Cardiac murmur     Corns and callus     DM (diabetes mellitus)     Fatty liver disease, nonalcoholic     GERD (gastroesophageal reflux disease)     HBP (high blood pressure)     Heartburn     Hepatitis A 1971    Hiatal hernia     Hyperlipidemia     Renal stone        Past Surgical History:   Procedure Laterality Date    APPENDECTOMY      CHOLECYSTECTOMY      COLONOSCOPY  2016    DR MORAN    COLONOSCOPY  2020    ENDOSCOPY  09/15/2016    DR MORAN    ENDOSCOPY  2018    FOOT SURGERY      HERNIA REPAIR      HYSTERECTOMY      LAPAROSCOPIC TUBAL LIGATION      TUBAL ABDOMINAL LIGATION         Family History: family history includes Breast cancer (age of onset: 60) in her mother; Diabetes in her mother; Heart attack in her father; Heart disease in her father and mother; Lung cancer (age of onset: 60) in her brother; Urolithiasis in her sister. Otherwise pertinent FHx was reviewed and not pertinent to current issue.    Social History:  reports that she has quit smoking. She has never used smokeless tobacco. She reports that she does not currently use alcohol. She reports that  she does not use drugs.    Home Medications:  Garlic, Vitamin E, Zinc, amLODIPine, ascorbic acid, aspirin, cholecalciferol, cloNIDine, cyanocobalamin, fish oil, metFORMIN, metoprolol tartrate, multivitamin with minerals, and psyllium      Allergies:  Allergies   Allergen Reactions    Gabapentin Unknown - High Severity, Dizziness, GI Intolerance and Unknown (See Comments)     Other reaction(s): Dizzy      Citalopram Hallucinations    Cyclobenzaprine Hallucinations    Hydroxyzine Hcl Mental Status Change       Objective   Objective     Vitals:   Temp:  [97.3 °F (36.3 °C)-98.1 °F (36.7 °C)] 98.1 °F (36.7 °C)  Heart Rate:  [73-83] 76  Resp:  [18] 18  BP: (127-181)/(74-98) 158/90  Physical Exam               Constitutional:         Awake, alert responsive, conversant, no obvious distress   Eyes:                       PERRLA, sclerae anicteric, no conjunctival injection   HEENT:                   Moist mucous membranes, no nasal or eye discharge, no throat congestion   Neck:                      Supple, no thyromegaly, no lymphadenopathy, trachea midline, no elevated JVD   Respiratory:           Clear to auscultation bilaterally, nonlabored respirations    Cardiovascular:     RRR, no murmurs, rubs, or gallops, palpable pedal pulses bilaterally,No bilateral ankle edema   Gastrointestinal:   Positive bowel sounds, soft, nontender, nondistended, no organomegaly   Musculoskeletal:   No clubbing or cyanosis to extremities, muscle wasting, joint swelling, muscle weakness   Psychiatric:             Appropriate affect, cooperative   Neurologic:            Awake alert ,oriented x 3, strength symmetric in all extremities, Cranial Nerves grossly intact to confrontation, speech clear   Skin:                      No rashes, bruising, skin ulcers, petechiae or ecchymosis    Result Review    Result Review:  I have personally reviewed the results from the time of this admission to 6/17/2024 09:40 EDT and agree with these findings:  []   Laboratory  []  Microbiology  []  Radiology  []  EKG/Telemetry   []  Cardiology/Vascular   []  Pathology  []  Old records  []  Other:    Results from last 7 days   Lab Units 06/16/24  1102   WBC 10*3/mm3 7.01   HEMOGLOBIN g/dL 13.3   PLATELETS 10*3/mm3 346     Results from last 7 days   Lab Units 06/16/24  1137   SODIUM mmol/L 131*   POTASSIUM mmol/L 4.1   CHLORIDE mmol/L 92*   CO2 mmol/L 23.5   ANION GAP mmol/L 15.5*   BUN mg/dL 18   CREATININE mg/dL 1.23*   GLUCOSE mg/dL 128*         Assessment & Plan   Assessment / Plan     Active Hospital Problems:  Active Hospital Problems    Diagnosis     **Dehydration with hyponatremia     Gastroparesis     Acute kidney injury        Plan:   Start IV fluids  Try Reglan  Labs tomorrow morning    VTE Prophylaxis:  Pharmacologic VTE prophylaxis orders are present.        CODE STATUS:    Code Status (Patient has no pulse and is not breathing): CPR (Attempt to Resuscitate)  Medical Interventions (Patient has pulse or is breathing): Full Support    Admission Status:  I believe this patient meets inpatient status.    Electronically signed by Hayley Brink MD, 06/16/24, 4:26 PM EDT.

## 2024-06-17 PROBLEM — K31.84 GASTROPARESIS: Status: ACTIVE | Noted: 2024-06-17

## 2024-06-17 PROBLEM — N17.9 ACUTE KIDNEY INJURY: Status: ACTIVE | Noted: 2024-06-17

## 2024-06-17 PROCEDURE — 25810000003 SODIUM CHLORIDE 0.9 % SOLUTION: Performed by: INTERNAL MEDICINE

## 2024-06-17 RX ORDER — SODIUM CHLORIDE 9 MG/ML
100 INJECTION, SOLUTION INTRAVENOUS CONTINUOUS
Status: ACTIVE | OUTPATIENT
Start: 2024-06-17 | End: 2024-06-18

## 2024-06-17 RX ORDER — METOCLOPRAMIDE 5 MG/1
10 TABLET ORAL
Status: COMPLETED | OUTPATIENT
Start: 2024-06-17 | End: 2024-06-19

## 2024-06-17 RX ADMIN — SODIUM CHLORIDE 100 ML/HR: 9 INJECTION, SOLUTION INTRAVENOUS at 20:15

## 2024-06-17 RX ADMIN — SODIUM CHLORIDE 100 ML/HR: 9 INJECTION, SOLUTION INTRAVENOUS at 10:41

## 2024-06-17 RX ADMIN — POLYETHYLENE GLYCOL 3350 17 G: 17 POWDER, FOR SOLUTION ORAL at 14:15

## 2024-06-17 RX ADMIN — METOCLOPRAMIDE 10 MG: 5 TABLET ORAL at 18:09

## 2024-06-17 RX ADMIN — CLONIDINE HYDROCHLORIDE 0.2 MG: 0.1 TABLET ORAL at 20:15

## 2024-06-17 RX ADMIN — METOPROLOL TARTRATE 100 MG: 50 TABLET, FILM COATED ORAL at 08:45

## 2024-06-17 RX ADMIN — METOPROLOL TARTRATE 100 MG: 50 TABLET, FILM COATED ORAL at 20:15

## 2024-06-17 RX ADMIN — METOCLOPRAMIDE 10 MG: 5 TABLET ORAL at 10:40

## 2024-06-17 NOTE — PROGRESS NOTES
[facility]     Progress Note    Patient Name: Debra Barnett  : 1955  MRN: [id number]  Primary Care Physician:  Ivan Clemente MD  Date of admission: 2024    Subjective she is still complaining of nauseous feeling    Review of Systems  All review of systems are negative except as mentioned in subjective complaints.    Objective   Objective     Vitals:   Temp:  [97.3 °F (36.3 °C)-98.1 °F (36.7 °C)] 98.1 °F (36.7 °C)  Heart Rate:  [73-83] 76  Resp:  [18] 18  BP: (127-181)/(74-98) 158/90  Physical Exam    Constitutional: Awake, alert responsive, conversant, no obvious distress              Psychiatric:  Appropriate affect, cooperative   Neurologic:  Awake alert ,oriented x 3, strength symmetric in all extremities, Cranial Nerves grossly intact to confrontation, speech clear   Eyes:   PERRLA, sclerae anicteric, no conjunctival injection   HEENT:  Moist mucous membranes, no nasal or eye discharge, no throat congestion   Neck:   Supple, no thyromegaly, no lymphadenopathy, trachea midline, no elevated JVD   Respiratory:  Clear to auscultation bilaterally, nonlabored respirations    Cardiovascular: RRR, no murmurs, rubs, or gallops, palpable pedal pulses bilaterally, No bilateral ankle edema   Gastrointestinal: Positive bowel sounds, soft, nontender, nondistended, no organomegaly   Musculoskeletal:  No clubbing or cyanosis to extremities,muscle wasting, joint swelling, muscle weakness             Skin:                      No rashes, bruising, skin ulcers, petechiae or ecchymosis    Result Review    Result Review:  I have personally reviewed the results from the time of this admission to 2024 09:42 EDT and agree with these findings:  []  Laboratory  []  Microbiology  []  Radiology  []  EKG/Telemetry   []  Cardiology/Vascular   []  Pathology  []  Old records  []  Other:    Results from last 7 days   Lab Units 24  1102   WBC 10*3/mm3 7.01   HEMOGLOBIN g/dL 13.3   PLATELETS 10*3/mm3 346      Results from last 7 days   Lab Units 06/16/24  1137   SODIUM mmol/L 131*   POTASSIUM mmol/L 4.1   CHLORIDE mmol/L 92*   CO2 mmol/L 23.5   ANION GAP mmol/L 15.5*   BUN mg/dL 18   CREATININE mg/dL 1.23*   GLUCOSE mg/dL 128*       Assessment & Plan   Assessment / Plan       Active Hospital Problems:    Active Hospital Problems    Diagnosis  POA    **Dehydration with hyponatremia [E86.0, E87.1]  Yes    Gastroparesis [K31.84]  Unknown    Acute kidney injury [N17.9]  Unknown     Secondary to dehydration         Plan:   Started on Reglan and hopefully it will help to improve her nausea  IV hydration that will help to improve hyponatremia and acute kidney injury       Electronically signed by Racheal Moran MD, 06/17/24, 9:42 AM EDT.

## 2024-06-17 NOTE — PLAN OF CARE
Goal Outcome Evaluation:         Pt was transferred to floor from [address] [place] [address] during shift. Report given by ALMAZ Luz. Vitals stable at this time. Pt tolerating full liquid diet. No complaints of nausea or vomiting during shift. Call light within reach. Continue care plan.

## 2024-06-17 NOTE — PLAN OF CARE
Goal Outcome Evaluation:  Plan of Care Reviewed With: patient           Outcome Evaluation: Pt new admit from the ED, RN assesed and initiated care plan, pt no c/o nausea, vomiting or abdominal pain this shift, ambulating and voiding without difficulty, notified MD of pt BP and pt requesting her BP medications see mar for new order, Pt resting in bed, is ablet to make needs known, call light in reach, will continue current POC

## 2024-06-18 LAB
ALBUMIN SERPL-MCNC: 4 G/DL (ref 3.5–5.2)
ALBUMIN/GLOB SERPL: 1.5 G/DL
ALP SERPL-CCNC: 34 U/L (ref 39–117)
ALT SERPL W P-5'-P-CCNC: 8 U/L (ref 1–33)
ANION GAP SERPL CALCULATED.3IONS-SCNC: 14.1 MMOL/L (ref 5–15)
AST SERPL-CCNC: 18 U/L (ref 1–32)
BILIRUB SERPL-MCNC: 0.5 MG/DL (ref 0–1.2)
BUN SERPL-MCNC: 9 MG/DL (ref 8–23)
BUN/CREAT SERPL: 8.3 (ref 7–25)
CALCIUM SPEC-SCNC: 9.2 MG/DL (ref 8.6–10.5)
CHLORIDE SERPL-SCNC: 99 MMOL/L (ref 98–107)
CO2 SERPL-SCNC: 20.9 MMOL/L (ref 22–29)
CREAT SERPL-MCNC: 1.09 MG/DL (ref 0.57–1)
EGFRCR SERPLBLD CKD-EPI 2021: 54.4 ML/MIN/1.73
GLOBULIN UR ELPH-MCNC: 2.6 GM/DL
GLUCOSE SERPL-MCNC: 130 MG/DL (ref 65–99)
POTASSIUM SERPL-SCNC: 3.9 MMOL/L (ref 3.5–5.2)
PROT SERPL-MCNC: 6.6 G/DL (ref 6–8.5)
SODIUM SERPL-SCNC: 134 MMOL/L (ref 136–145)

## 2024-06-18 PROCEDURE — 25010000002 ENOXAPARIN PER 10 MG: Performed by: STUDENT IN AN ORGANIZED HEALTH CARE EDUCATION/TRAINING PROGRAM

## 2024-06-18 PROCEDURE — 63710000001 PREDNISONE PER 1 MG: Performed by: NURSE PRACTITIONER

## 2024-06-18 PROCEDURE — 80053 COMPREHEN METABOLIC PANEL: CPT | Performed by: INTERNAL MEDICINE

## 2024-06-18 PROCEDURE — 25810000003 SODIUM CHLORIDE 0.9 % SOLUTION: Performed by: INTERNAL MEDICINE

## 2024-06-18 RX ORDER — PREDNISONE 20 MG/1
20 TABLET ORAL ONCE
Status: COMPLETED | OUTPATIENT
Start: 2024-06-18 | End: 2024-06-18

## 2024-06-18 RX ADMIN — CLONIDINE HYDROCHLORIDE 0.2 MG: 0.1 TABLET ORAL at 20:01

## 2024-06-18 RX ADMIN — METOPROLOL TARTRATE 100 MG: 50 TABLET, FILM COATED ORAL at 20:01

## 2024-06-18 RX ADMIN — BISACODYL 10 MG: 10 SUPPOSITORY RECTAL at 06:14

## 2024-06-18 RX ADMIN — SODIUM CHLORIDE 100 ML/HR: 9 INJECTION, SOLUTION INTRAVENOUS at 06:14

## 2024-06-18 RX ADMIN — PREDNISONE 20 MG: 20 TABLET ORAL at 20:01

## 2024-06-18 RX ADMIN — Medication 5 MG: at 20:46

## 2024-06-18 RX ADMIN — ALPRAZOLAM 0.5 MG: 0.25 TABLET ORAL at 20:46

## 2024-06-18 RX ADMIN — ACETAMINOPHEN 650 MG: 325 TABLET ORAL at 19:49

## 2024-06-18 RX ADMIN — METOPROLOL TARTRATE 100 MG: 50 TABLET, FILM COATED ORAL at 08:05

## 2024-06-18 RX ADMIN — METOCLOPRAMIDE 10 MG: 5 TABLET ORAL at 12:13

## 2024-06-18 RX ADMIN — METOCLOPRAMIDE 10 MG: 5 TABLET ORAL at 06:14

## 2024-06-18 RX ADMIN — METOCLOPRAMIDE 10 MG: 5 TABLET ORAL at 18:04

## 2024-06-18 NOTE — PROGRESS NOTES
[facility]     Progress Note    Patient Name: Debra Barnett  : 1955  MRN: [id number]  Primary Care Physician:  Ivan Clemente MD  Date of admission: 2024    Subjective   Patient is doing much better has no new issues  Nausea is improving  Review of Systems  All review of systems are negative except as mentioned in subjective complaints.    Objective   Objective     Vitals:   Temp:  [97.2 °F (36.2 °C)-98.5 °F (36.9 °C)] 98.5 °F (36.9 °C)  Heart Rate:  [64-85] 68  Resp:  [16-20] 16  BP: (144-180)/(66-91) 160/81  Physical Exam    Constitutional: Awake, alert responsive, conversant, no obvious distress              Psychiatric:  Appropriate affect, cooperative   Neurologic:  Awake alert ,oriented x 3, strength symmetric in all extremities, Cranial Nerves grossly intact to confrontation, speech clear   Eyes:   PERRLA, sclerae anicteric, no conjunctival injection   HEENT:  Moist mucous membranes, no nasal or eye discharge, no throat congestion   Neck:   Supple, no thyromegaly, no lymphadenopathy, trachea midline, no elevated JVD   Respiratory:  Clear to auscultation bilaterally, nonlabored respirations    Cardiovascular: RRR, no murmurs, rubs, or gallops, palpable pedal pulses bilaterally, No bilateral ankle edema   Gastrointestinal: Positive bowel sounds, soft, nontender, nondistended, no organomegaly   Musculoskeletal:  No clubbing or cyanosis to extremities,muscle wasting, joint swelling, muscle weakness             Skin:                      No rashes, bruising, skin ulcers, petechiae or ecchymosis    Result Review    Result Review:  I have personally reviewed the results from the time of this admission to 2024 08:45 EDT and agree with these findings:  []  Laboratory  []  Microbiology  []  Radiology  []  EKG/Telemetry   []  Cardiology/Vascular   []  Pathology  []  Old records  []  Other:    Results from last 7 days   Lab Units 24  1102   WBC 10*3/mm3 7.01   HEMOGLOBIN g/dL 13.3    PLATELETS 10*3/mm3 346     Results from last 7 days   Lab Units 06/18/24  0710 06/16/24  1137   SODIUM mmol/L 134* 131*   POTASSIUM mmol/L 3.9 4.1   CHLORIDE mmol/L 99 92*   CO2 mmol/L 20.9* 23.5   ANION GAP mmol/L 14.1 15.5*   BUN mg/dL 9 18   CREATININE mg/dL 1.09* 1.23*   GLUCOSE mg/dL 130* 128*       Assessment & Plan   Assessment / Plan       Active Hospital Problems:    Active Hospital Problems    Diagnosis  POA    **Dehydration with hyponatremia [E86.0, E87.1]  Yes    Gastroparesis [K31.84]  Unknown    Acute kidney injury [N17.9]  Unknown     Secondary to dehydration     JACINTA improving    Plan:   Continue IV fluid and Reglan       Electronically signed by Racheal Moran MD, 06/18/24, 8:45 AM EDT.

## 2024-06-18 NOTE — PLAN OF CARE
Goal Outcome Evaluation:  Plan of Care Reviewed With: patient           Outcome Evaluation: Pt is alert and orient x4.  VS WNl for pt.  Pt denies any pain/discomfort.

## 2024-06-18 NOTE — PLAN OF CARE
Goal Outcome Evaluation:           Progress: no change  Outcome Evaluation: Vss. No complaints of pain or nausea. Given prn stool softener. No other changes. Will continue plan of care.

## 2024-06-19 ENCOUNTER — APPOINTMENT (OUTPATIENT)
Dept: CT IMAGING | Facility: HOSPITAL | Age: 69
End: 2024-06-19
Payer: MEDICARE

## 2024-06-19 ENCOUNTER — APPOINTMENT (OUTPATIENT)
Dept: GENERAL RADIOLOGY | Facility: HOSPITAL | Age: 69
End: 2024-06-19
Payer: MEDICARE

## 2024-06-19 ENCOUNTER — HOSPITAL ENCOUNTER (OUTPATIENT)
Facility: HOSPITAL | Age: 69
Setting detail: OBSERVATION
Discharge: HOME | End: 2024-06-20
Attending: EMERGENCY MEDICINE | Admitting: INTERNAL MEDICINE
Payer: MEDICARE

## 2024-06-19 ENCOUNTER — READMISSION MANAGEMENT (OUTPATIENT)
Dept: CALL CENTER | Facility: HOSPITAL | Age: 69
End: 2024-06-19
Payer: MEDICARE

## 2024-06-19 VITALS
TEMPERATURE: 98.1 F | HEART RATE: 81 BPM | DIASTOLIC BLOOD PRESSURE: 87 MMHG | HEIGHT: 62 IN | BODY MASS INDEX: 20.57 KG/M2 | WEIGHT: 111.77 LBS | SYSTOLIC BLOOD PRESSURE: 150 MMHG | RESPIRATION RATE: 16 BRPM | OXYGEN SATURATION: 97 %

## 2024-06-19 DIAGNOSIS — R29.810 FACIAL WEAKNESS: Primary | ICD-10-CM

## 2024-06-19 DIAGNOSIS — I10 UNCONTROLLED HYPERTENSION: ICD-10-CM

## 2024-06-19 LAB
ALBUMIN SERPL-MCNC: 4.2 G/DL (ref 3.5–5.2)
ALBUMIN SERPL-MCNC: 4.5 G/DL (ref 3.5–5.2)
ALBUMIN/GLOB SERPL: 1.4 G/DL
ALBUMIN/GLOB SERPL: 1.7 G/DL
ALP SERPL-CCNC: 39 U/L (ref 39–117)
ALP SERPL-CCNC: 40 U/L (ref 39–117)
ALT SERPL W P-5'-P-CCNC: 10 U/L (ref 1–33)
ALT SERPL W P-5'-P-CCNC: 12 U/L (ref 1–33)
ANION GAP SERPL CALCULATED.3IONS-SCNC: 13.3 MMOL/L (ref 5–15)
ANION GAP SERPL CALCULATED.3IONS-SCNC: 15.3 MMOL/L (ref 5–15)
AST SERPL-CCNC: 13 U/L (ref 1–32)
AST SERPL-CCNC: 18 U/L (ref 1–32)
BASOPHILS # BLD AUTO: 0.02 10*3/MM3 (ref 0–0.2)
BASOPHILS NFR BLD AUTO: 0.2 % (ref 0–1.5)
BILIRUB SERPL-MCNC: 0.4 MG/DL (ref 0–1.2)
BILIRUB SERPL-MCNC: 0.4 MG/DL (ref 0–1.2)
BUN SERPL-MCNC: 13 MG/DL (ref 8–23)
BUN SERPL-MCNC: 14 MG/DL (ref 8–23)
BUN/CREAT SERPL: 11.1 (ref 7–25)
BUN/CREAT SERPL: 12.6 (ref 7–25)
CALCIUM SPEC-SCNC: 9.5 MG/DL (ref 8.6–10.5)
CALCIUM SPEC-SCNC: 9.7 MG/DL (ref 8.6–10.5)
CHLORIDE SERPL-SCNC: 95 MMOL/L (ref 98–107)
CHLORIDE SERPL-SCNC: 96 MMOL/L (ref 98–107)
CO2 SERPL-SCNC: 22.7 MMOL/L (ref 22–29)
CO2 SERPL-SCNC: 23.7 MMOL/L (ref 22–29)
CREAT SERPL-MCNC: 1.11 MG/DL (ref 0.57–1)
CREAT SERPL-MCNC: 1.17 MG/DL (ref 0.57–1)
DEPRECATED RDW RBC AUTO: 38 FL (ref 37–54)
DEPRECATED RDW RBC AUTO: 38.1 FL (ref 37–54)
EGFRCR SERPLBLD CKD-EPI 2021: 50 ML/MIN/1.73
EGFRCR SERPLBLD CKD-EPI 2021: 53.3 ML/MIN/1.73
EOSINOPHIL # BLD AUTO: 0.04 10*3/MM3 (ref 0–0.4)
EOSINOPHIL NFR BLD AUTO: 0.5 % (ref 0.3–6.2)
ERYTHROCYTE [DISTWIDTH] IN BLOOD BY AUTOMATED COUNT: 11.9 % (ref 12.3–15.4)
ERYTHROCYTE [DISTWIDTH] IN BLOOD BY AUTOMATED COUNT: 12.1 % (ref 12.3–15.4)
GLOBULIN UR ELPH-MCNC: 2.7 GM/DL
GLOBULIN UR ELPH-MCNC: 3.1 GM/DL
GLUCOSE SERPL-MCNC: 148 MG/DL (ref 65–99)
GLUCOSE SERPL-MCNC: 185 MG/DL (ref 65–99)
HCT VFR BLD AUTO: 38.9 % (ref 34–46.6)
HCT VFR BLD AUTO: 39.2 % (ref 34–46.6)
HGB BLD-MCNC: 13.5 G/DL (ref 12–15.9)
HGB BLD-MCNC: 13.6 G/DL (ref 12–15.9)
HOLD SPECIMEN: NORMAL
HOLD SPECIMEN: NORMAL
IMM GRANULOCYTES # BLD AUTO: 0.02 10*3/MM3 (ref 0–0.05)
IMM GRANULOCYTES NFR BLD AUTO: 0.2 % (ref 0–0.5)
LYMPHOCYTES # BLD AUTO: 2.29 10*3/MM3 (ref 0.7–3.1)
LYMPHOCYTES NFR BLD AUTO: 28.2 % (ref 19.6–45.3)
MCH RBC QN AUTO: 29.8 PG (ref 26.6–33)
MCH RBC QN AUTO: 29.9 PG (ref 26.6–33)
MCHC RBC AUTO-ENTMCNC: 34.7 G/DL (ref 31.5–35.7)
MCHC RBC AUTO-ENTMCNC: 34.7 G/DL (ref 31.5–35.7)
MCV RBC AUTO: 85.9 FL (ref 79–97)
MCV RBC AUTO: 86.2 FL (ref 79–97)
MONOCYTES # BLD AUTO: 0.73 10*3/MM3 (ref 0.1–0.9)
MONOCYTES NFR BLD AUTO: 9 % (ref 5–12)
NEUTROPHILS NFR BLD AUTO: 5.02 10*3/MM3 (ref 1.7–7)
NEUTROPHILS NFR BLD AUTO: 61.9 % (ref 42.7–76)
NRBC BLD AUTO-RTO: 0 /100 WBC (ref 0–0.2)
NT-PROBNP SERPL-MCNC: 318.8 PG/ML (ref 0–900)
PLATELET # BLD AUTO: 334 10*3/MM3 (ref 140–450)
PLATELET # BLD AUTO: 341 10*3/MM3 (ref 140–450)
PMV BLD AUTO: 8.7 FL (ref 6–12)
PMV BLD AUTO: 8.9 FL (ref 6–12)
POTASSIUM SERPL-SCNC: 3.6 MMOL/L (ref 3.5–5.2)
POTASSIUM SERPL-SCNC: 4.1 MMOL/L (ref 3.5–5.2)
PROT SERPL-MCNC: 7.2 G/DL (ref 6–8.5)
PROT SERPL-MCNC: 7.3 G/DL (ref 6–8.5)
RBC # BLD AUTO: 4.53 10*6/MM3 (ref 3.77–5.28)
RBC # BLD AUTO: 4.55 10*6/MM3 (ref 3.77–5.28)
SODIUM SERPL-SCNC: 132 MMOL/L (ref 136–145)
SODIUM SERPL-SCNC: 134 MMOL/L (ref 136–145)
TROPONIN T SERPL HS-MCNC: <6 NG/L
WBC NRBC COR # BLD AUTO: 5.72 10*3/MM3 (ref 3.4–10.8)
WBC NRBC COR # BLD AUTO: 8.12 10*3/MM3 (ref 3.4–10.8)
WHOLE BLOOD HOLD COAG: NORMAL
WHOLE BLOOD HOLD SPECIMEN: NORMAL

## 2024-06-19 PROCEDURE — 93005 ELECTROCARDIOGRAM TRACING: CPT | Performed by: EMERGENCY MEDICINE

## 2024-06-19 PROCEDURE — 99285 EMERGENCY DEPT VISIT HI MDM: CPT

## 2024-06-19 PROCEDURE — 96375 TX/PRO/DX INJ NEW DRUG ADDON: CPT

## 2024-06-19 PROCEDURE — 93010 ELECTROCARDIOGRAM REPORT: CPT | Performed by: INTERNAL MEDICINE

## 2024-06-19 PROCEDURE — 25010000002 LABETALOL 5 MG/ML SOLUTION: Performed by: INTERNAL MEDICINE

## 2024-06-19 PROCEDURE — 25010000002 ENOXAPARIN PER 10 MG: Performed by: STUDENT IN AN ORGANIZED HEALTH CARE EDUCATION/TRAINING PROGRAM

## 2024-06-19 PROCEDURE — 96374 THER/PROPH/DIAG INJ IV PUSH: CPT

## 2024-06-19 PROCEDURE — 71045 X-RAY EXAM CHEST 1 VIEW: CPT

## 2024-06-19 PROCEDURE — 80053 COMPREHEN METABOLIC PANEL: CPT | Performed by: EMERGENCY MEDICINE

## 2024-06-19 PROCEDURE — G0378 HOSPITAL OBSERVATION PER HR: HCPCS

## 2024-06-19 PROCEDURE — 85025 COMPLETE CBC W/AUTO DIFF WBC: CPT | Performed by: EMERGENCY MEDICINE

## 2024-06-19 PROCEDURE — 80053 COMPREHEN METABOLIC PANEL: CPT | Performed by: INTERNAL MEDICINE

## 2024-06-19 PROCEDURE — 84484 ASSAY OF TROPONIN QUANT: CPT | Performed by: EMERGENCY MEDICINE

## 2024-06-19 PROCEDURE — 83880 ASSAY OF NATRIURETIC PEPTIDE: CPT | Performed by: EMERGENCY MEDICINE

## 2024-06-19 PROCEDURE — 25010000002 FUROSEMIDE PER 20 MG: Performed by: INTERNAL MEDICINE

## 2024-06-19 PROCEDURE — 70450 CT HEAD/BRAIN W/O DYE: CPT

## 2024-06-19 PROCEDURE — 85027 COMPLETE CBC AUTOMATED: CPT | Performed by: INTERNAL MEDICINE

## 2024-06-19 RX ORDER — METOCLOPRAMIDE 5 MG/1
10 TABLET ORAL
Status: DISCONTINUED | OUTPATIENT
Start: 2024-06-19 | End: 2024-06-19 | Stop reason: HOSPADM

## 2024-06-19 RX ORDER — FUROSEMIDE 10 MG/ML
60 INJECTION INTRAMUSCULAR; INTRAVENOUS ONCE
Status: COMPLETED | OUTPATIENT
Start: 2024-06-19 | End: 2024-06-19

## 2024-06-19 RX ORDER — ONDANSETRON 2 MG/ML
4 INJECTION INTRAMUSCULAR; INTRAVENOUS EVERY 6 HOURS PRN
Status: DISCONTINUED | OUTPATIENT
Start: 2024-06-19 | End: 2024-06-20 | Stop reason: HOSPADM

## 2024-06-19 RX ORDER — LABETALOL HYDROCHLORIDE 5 MG/ML
20 INJECTION, SOLUTION INTRAVENOUS EVERY 4 HOURS PRN
Status: DISCONTINUED | OUTPATIENT
Start: 2024-06-19 | End: 2024-06-20 | Stop reason: HOSPADM

## 2024-06-19 RX ORDER — BISACODYL 5 MG/1
5 TABLET, DELAYED RELEASE ORAL DAILY PRN
Status: DISCONTINUED | OUTPATIENT
Start: 2024-06-19 | End: 2024-06-20 | Stop reason: HOSPADM

## 2024-06-19 RX ORDER — ENOXAPARIN SODIUM 100 MG/ML
30 INJECTION SUBCUTANEOUS NIGHTLY
Status: DISCONTINUED | OUTPATIENT
Start: 2024-06-19 | End: 2024-06-20

## 2024-06-19 RX ORDER — ACETAMINOPHEN 500 MG
500 TABLET ORAL ONCE
Status: COMPLETED | OUTPATIENT
Start: 2024-06-19 | End: 2024-06-19

## 2024-06-19 RX ORDER — AMOXICILLIN 250 MG
2 CAPSULE ORAL 2 TIMES DAILY PRN
Status: DISCONTINUED | OUTPATIENT
Start: 2024-06-19 | End: 2024-06-20 | Stop reason: HOSPADM

## 2024-06-19 RX ORDER — BISACODYL 10 MG
10 SUPPOSITORY, RECTAL RECTAL DAILY PRN
Status: DISCONTINUED | OUTPATIENT
Start: 2024-06-19 | End: 2024-06-20 | Stop reason: HOSPADM

## 2024-06-19 RX ORDER — FAMOTIDINE 20 MG/1
40 TABLET, FILM COATED ORAL DAILY
Status: DISCONTINUED | OUTPATIENT
Start: 2024-06-19 | End: 2024-06-20 | Stop reason: HOSPADM

## 2024-06-19 RX ORDER — ALUMINA, MAGNESIA, AND SIMETHICONE 2400; 2400; 240 MG/30ML; MG/30ML; MG/30ML
15 SUSPENSION ORAL EVERY 6 HOURS PRN
Status: DISCONTINUED | OUTPATIENT
Start: 2024-06-19 | End: 2024-06-20 | Stop reason: HOSPADM

## 2024-06-19 RX ORDER — POTASSIUM CHLORIDE 750 MG/1
40 CAPSULE, EXTENDED RELEASE ORAL ONCE
Status: COMPLETED | OUTPATIENT
Start: 2024-06-19 | End: 2024-06-19

## 2024-06-19 RX ORDER — ALPRAZOLAM 0.25 MG/1
0.5 TABLET ORAL EVERY 8 HOURS PRN
Status: DISCONTINUED | OUTPATIENT
Start: 2024-06-19 | End: 2024-06-20 | Stop reason: HOSPADM

## 2024-06-19 RX ORDER — POLYETHYLENE GLYCOL 3350 17 G/17G
17 POWDER, FOR SOLUTION ORAL DAILY PRN
Status: DISCONTINUED | OUTPATIENT
Start: 2024-06-19 | End: 2024-06-20 | Stop reason: HOSPADM

## 2024-06-19 RX ORDER — SODIUM CHLORIDE 0.9 % (FLUSH) 0.9 %
10 SYRINGE (ML) INJECTION AS NEEDED
Status: DISCONTINUED | OUTPATIENT
Start: 2024-06-19 | End: 2024-06-20 | Stop reason: HOSPADM

## 2024-06-19 RX ORDER — AMLODIPINE BESYLATE 5 MG/1
5 TABLET ORAL
Status: DISCONTINUED | OUTPATIENT
Start: 2024-06-20 | End: 2024-06-20

## 2024-06-19 RX ADMIN — METOCLOPRAMIDE 10 MG: 5 TABLET ORAL at 09:02

## 2024-06-19 RX ADMIN — FAMOTIDINE 40 MG: 20 TABLET ORAL at 22:38

## 2024-06-19 RX ADMIN — METOPROLOL TARTRATE 100 MG: 50 TABLET, FILM COATED ORAL at 08:15

## 2024-06-19 RX ADMIN — LABETALOL HYDROCHLORIDE 20 MG: 5 INJECTION, SOLUTION INTRAVENOUS at 21:14

## 2024-06-19 RX ADMIN — POTASSIUM CHLORIDE 40 MEQ: 750 CAPSULE, EXTENDED RELEASE ORAL at 22:38

## 2024-06-19 RX ADMIN — FUROSEMIDE 60 MG: 10 INJECTION, SOLUTION INTRAMUSCULAR; INTRAVENOUS at 22:37

## 2024-06-19 RX ADMIN — METOCLOPRAMIDE 10 MG: 5 TABLET ORAL at 06:32

## 2024-06-19 RX ADMIN — ACETAMINOPHEN 500 MG: 500 TABLET ORAL at 21:22

## 2024-06-19 RX ADMIN — ALPRAZOLAM 0.5 MG: 0.25 TABLET ORAL at 22:38

## 2024-06-19 RX ADMIN — METOPROLOL TARTRATE 5 MG: 1 INJECTION, SOLUTION INTRAVENOUS at 20:09

## 2024-06-19 NOTE — DISCHARGE SUMMARY
[facility]   DISCHARGE SUMMARY    Patient Name: Debra Barnett  : 1955  MRN: [id number]    Date of Admission: 2024  Date of Discharge: 2024  Primary Care Physician: Ivan Clemente MD    Consults       Date and Time Order Name Status Description    2024  4:06 PM Nephrology  (on-call MD unless specified)              Hospital Course     Presenting Problem:   Lactic acidosis [E87.20]  Dehydration with hyponatremia [E86.0, E87.1]  Abdominal pain, acute, epigastric [R10.13]  History of Nissen fundoplication [Z98.890]  Nausea and vomiting, unspecified vomiting type [R11.2]    Active and resolved problems  Principal Problem:    Dehydration with hyponatremia  Overview:  Active Problems:    Gastroparesis  Overview:    Acute kidney injury  Overview:  Resolved Problems:    * No resolved hospital problems. *      Hospital Course:  Debra Barnett is a 69 y.o. female with past medical history significant for Berman's esophagitis diabetes hyperlipidemia kidney stone was admitted through the emergency room for persistent nausea dry heaving for 2 weeks.  Patient had CT of the abdomen pelvis done which was essentially unremarkable.  Patient had diabetic gastroparesis and was started on IV Reglan  And IV fluids as patient was dehydrated.  Patient also had acute kidney injury.  With hydration and Reglan patient's JACINTA resolved and gastroparesis symptoms improved  Patient may benefit from Reglan as outpatient at nighttime.  At this time patient is clinically stable and asymptomatic so will be discharged home    Vital Signs:  Temp:  [97.3 °F (36.3 °C)-98.1 °F (36.7 °C)] 98.1 °F (36.7 °C)  Heart Rate:  [71-86] 81  Resp:  [16-18] 16  BP: (141-176)/(72-98) 150/87      Discharge Details        Discharge Medications        Continue These Medications        Instructions Start Date   amLODIPine 5 MG tablet  Commonly known as: NORVASC   5 mg, Oral, Every Morning      ascorbic acid 1000 MG tablet  Commonly  known as: VITAMIN C   1,000 mg, Oral, Every Morning      aspirin 81 MG EC tablet   Take 1 tablet by mouth Every Night.      cholecalciferol 25 MCG (1000 UT) tablet  Commonly known as: VITAMIN D3   1,000 Units, Oral, Daily      cloNIDine 0.2 MG tablet  Commonly known as: CATAPRES   0.2 mg, Oral, Nightly      cyanocobalamin 1000 MCG tablet  Commonly known as: VITAMIN B-12   1,000 mcg, Oral, Every Morning      fish oil 1200 MG capsule capsule   1,200 mg, Oral, Daily With Breakfast      Garlic 400 MG tablet delayed-release   400 mg, Oral, Daily      metFORMIN 1000 MG tablet  Commonly known as: GLUCOPHAGE   1,000 mg, 2 Times Daily With Meals      metoprolol tartrate 100 MG tablet  Commonly known as: LOPRESSOR   100 mg, Oral, 2 Times Daily      multivitamin with minerals tablet tablet   1 tablet, Oral, Daily      psyllium 0.52 g capsule  Commonly known as: METAMUCIL   1 capsule, Oral, Daily      Vitamin E 450 MG (1000 UT) capsule   450 mg, Oral, Daily      Zinc 50 MG tablet   Take 1 tablet by mouth Every Morning.               Allergies   Allergen Reactions    Gabapentin Unknown - High Severity, Dizziness, GI Intolerance and Unknown (See Comments)     Other reaction(s): Dizzy      Citalopram Hallucinations    Cyclobenzaprine Hallucinations    Hydroxyzine Hcl Mental Status Change         Discharge Disposition:  Home or Self Care    Diet:        Discharge Activity:         CODE STATUS:    Code Status and Medical Interventions:   Ordered at: 06/16/24 1625     Code Status (Patient has no pulse and is not breathing):    CPR (Attempt to Resuscitate)     Medical Interventions (Patient has pulse or is breathing):    Full Support         No future appointments.        Time spent on Discharge including face to face service:  30 minutes    Electronically signed by Racheal Moran MD, 06/19/24, 8:26 AM EDT.

## 2024-06-19 NOTE — ED PROVIDER NOTES
Time: 4:44 PM EDT  Date of encounter:  6/19/2024  Independent Historian/Clinical History and Information was obtained by:   Patient and Family    History is limited by: N/A    Chief Complaint: Shortness of air and numbness to left face      History of Present Illness:  Patient is a 69 y.o. year old female who presents to the emergency department for evaluation of dyspnea that started yesterday.  Patient was just discharged from hospital yesterday.  Patient states that she is also having a Bell's palsy flare up on the left side.  She states that this flareup began yesterday while she was in the hospital and she told the nurse about it.  The last flareup was 18 years ago does. not take hypertension medication.  Denies extremity weakness, confusion or numbness of the extremities.    HPI    Patient Care Team  Primary Care Provider: Ivan Clemente MD    Past Medical History:     Allergies   Allergen Reactions    Gabapentin Unknown - High Severity, Dizziness, GI Intolerance and Unknown (See Comments)     Other reaction(s): Dizzy      Citalopram Hallucinations    Cyclobenzaprine Hallucinations    Hydroxyzine Hcl Mental Status Change     Past Medical History:   Diagnosis Date    Anemia     Berman's esophagus with low grade dysplasia     Cardiac murmur     Corns and callus     DM (diabetes mellitus)     Fatty liver disease, nonalcoholic     GERD (gastroesophageal reflux disease)     HBP (high blood pressure)     Heartburn     Hepatitis A 1971    Hiatal hernia     Hyperlipidemia     Renal stone      Past Surgical History:   Procedure Laterality Date    APPENDECTOMY      CHOLECYSTECTOMY      COLONOSCOPY  08/11/2016    DR MORAN    COLONOSCOPY  07/28/2020    ENDOSCOPY  09/15/2016    DR MORAN    ENDOSCOPY  04/04/2018    FOOT SURGERY      HERNIA REPAIR      HYSTERECTOMY      LAPAROSCOPIC TUBAL LIGATION      TUBAL ABDOMINAL LIGATION       Family History   Problem Relation Age of Onset    Breast cancer Mother 60     Diabetes Mother     Heart disease Mother     Heart attack Father     Heart disease Father     Urolithiasis Sister     Lung cancer Brother 60    Colon cancer Neg Hx        Home Medications:  Prior to Admission medications    Medication Sig Start Date End Date Taking? Authorizing Provider   amLODIPine (NORVASC) 5 MG tablet Take 1 tablet by mouth Every Morning. 4/23/21   Aleks Carlson MD   ascorbic acid (VITAMIN C) 1000 MG tablet Take 1 tablet by mouth Every Morning. 7/14/21   Aleks Carlson MD   aspirin 81 MG EC tablet Take 1 tablet by mouth Every Night.    Aleks Carlson MD   Cholecalciferol 25 MCG (1000 UT) tablet Take 1 tablet by mouth Daily.    Aleks Carlson MD   cloNIDine (CATAPRES) 0.2 MG tablet Take 1 tablet by mouth Every Night. 4/23/21   Aleks Carlson MD   cyanocobalamin (VITAMIN B-12) 1000 MCG tablet Take 1 tablet by mouth Every Morning. 7/14/21   Aleks Carlson MD   Garlic 400 MG tablet delayed-release Take 400 mg by mouth Daily.    Aleks Carlson MD   metFORMIN (GLUCOPHAGE) 1000 MG tablet 1 tablet 2 (Two) Times a Day With Meals. 4/23/21   Aleks Carlson MD   metoprolol tartrate (LOPRESSOR) 100 MG tablet Take 1 tablet by mouth 2 (Two) Times a Day. 4/23/21   Aleks Carlson MD   multivitamin with minerals tablet tablet Take 1 tablet by mouth Daily.    Aleks Carlson MD   Omega-3 Fatty Acids (fish oil) 1200 MG capsule capsule Take 1 capsule by mouth Daily With Breakfast.    Aleks Carlson MD   psyllium (METAMUCIL) 0.52 g capsule Take 1 capsule by mouth Daily.    Aleks Carlson MD   Vitamin E 450 MG (1000 UT) capsule Take 1 capsule by mouth Daily. 7/14/21   Emergency, Nurse Epic, RN   Zinc 50 MG tablet Take 1 tablet by mouth Every Morning.    Aleks Carlson MD        Social History:   Social History     Tobacco Use    Smoking status: Former    Smokeless tobacco: Never   Vaping Use    Vaping status: Never Used  "  Substance Use Topics    Alcohol use: Not Currently     Comment: RARELY    Drug use: Never         Review of Systems:  Review of Systems   Constitutional:  Negative for chills and fever.   HENT:  Negative for congestion, ear pain and sore throat.    Eyes:  Negative for pain.   Respiratory:  Positive for shortness of breath. Negative for cough and chest tightness.    Cardiovascular:  Negative for chest pain.   Gastrointestinal:  Negative for abdominal pain, diarrhea, nausea and vomiting.   Genitourinary:  Negative for flank pain and hematuria.   Musculoskeletal:  Negative for joint swelling.   Skin:  Negative for pallor.   Neurological:  Positive for facial asymmetry. Negative for seizures and headaches.   All other systems reviewed and are negative.       Physical Exam:  BP (!) 215/121 (BP Location: Right arm, Patient Position: Lying)   Pulse 104   Temp 97.7 °F (36.5 °C) (Oral)   Resp 20   Ht 154.9 cm (61\")   Wt 49 kg (108 lb 0.4 oz)   SpO2 98%   BMI 20.41 kg/m²     Physical Exam  HENT:      Head: Normocephalic.      Mouth/Throat:      Mouth: Mucous membranes are moist.   Eyes:      Pupils: Pupils are equal, round, and reactive to light.   Pulmonary:      Effort: Pulmonary effort is normal.   Abdominal:      General: There is no distension.   Musculoskeletal:      Cervical back: Neck supple.   Skin:     General: Skin is warm and dry.      Capillary Refill: Capillary refill takes less than 2 seconds.   Neurological:      General: No focal deficit present.      Mental Status: She is alert and oriented to person, place, and time.      Comments: There is both numbness to the left face along with left facial weakness with involvement of the left forehead.   Psychiatric:         Mood and Affect: Mood normal.         Behavior: Behavior normal.                  Procedures:  Procedures      Medical Decision Making:      Comorbidities that affect care:    Hypertension    External Notes reviewed:    Previous Admission " Note: Recent admission for abdominal pain      The following orders were placed and all results were independently analyzed by me:  Orders Placed This Encounter   Procedures    XR Chest 1 View    CT Head Without Contrast    [place] Draw    Comprehensive Metabolic Panel    BNP    Single High Sensitivity Troponin T    CBC Auto Differential    Diet: Regular/House; Fluid Consistency: Thin (IDDSI 0)    Undress & Gown    Continuous Pulse Oximetry    Vital Signs    Vital Signs    Up in Chair    Activity (Specify Details)    Turn Patient    Up With Assistance    Weigh Patient    Daily Weights    Strict Intake & Output    Oral Care    Code Status and Medical Interventions:    IP General Consult (Use specialty-specific consult if known)    IP General Consult (Use specialty-specific consult if known)    Oxygen Therapy- Nasal Cannula; Titrate 1-6 LPM Per SpO2; 90 - 95%    Pulse Oximetry,  Spot    ECG 12 Lead ED Triage Standing Order; SOA    Insert Peripheral IV    Initiate Observation Status    Initiate Observation Status    CBC & Differential    Green Top (Gel)    Lavender Top    Gold Top - SST    Light Blue Top       Medications Given in the Emergency Department:  Medications   sodium chloride 0.9 % flush 10 mL (has no administration in time range)   aluminum-magnesium hydroxide-simethicone (MAALOX MAX) 400-400-40 MG/5ML suspension 15 mL (has no administration in time range)   famotidine (PEPCID) tablet 40 mg (40 mg Oral Given 6/19/24 2238)   ALPRAZolam (XANAX) tablet 0.5 mg (0.5 mg Oral Given 6/19/24 2238)   sennosides-docusate (PERICOLACE) 8.6-50 MG per tablet 2 tablet (has no administration in time range)     And   polyethylene glycol (MIRALAX) packet 17 g (has no administration in time range)     And   bisacodyl (DULCOLAX) EC tablet 5 mg (has no administration in time range)     And   bisacodyl (DULCOLAX) suppository 10 mg (has no administration in time range)   ondansetron (ZOFRAN) injection 4 mg (has no administration  in time range)   Pharmacy to Dose enoxaparin (LOVENOX) (has no administration in time range)   Enoxaparin Sodium (LOVENOX) syringe 30 mg (30 mg Subcutaneous Not Given 6/19/24 2240)   amLODIPine (NORVASC) tablet 5 mg (has no administration in time range)   labetalol (NORMODYNE,TRANDATE) injection 20 mg (20 mg Intravenous Given 6/19/24 2114)   metoprolol tartrate (LOPRESSOR) injection 5 mg (5 mg Intravenous Given 6/19/24 2009)   acetaminophen (TYLENOL) tablet 500 mg (500 mg Oral Given 6/19/24 2122)   potassium chloride (MICRO-K/KLOR-CON) CR capsule (40 mEq Oral Given 6/19/24 2238)   furosemide (LASIX) injection 60 mg (60 mg Intravenous Given 6/19/24 2237)        ED Course:    ED Course as of 06/20/24 0238   Wed Jun 19, 2024   1645 --- PROVIDER IN TRIAGE NOTE ---    The patient was evaluated by Cherry rubin in triage. Orders were placed and the patient is currently awaiting disposition.    [AJ]      ED Course User Index  [AJ] Cherry Rivers PA-C     EKG: Sinus rhythm with rate 88 bpm  Normal P wave and peer interval  Left ventricular hypertrophy  Left axis deviation  No acute ischemic changes.    Labs:    Lab Results (last 24 hours)       Procedure Component Value Units Date/Time    Comprehensive Metabolic Panel [[id number]]  (Abnormal) Collected: 06/19/24 0631    Specimen: Blood from Arm, Left Updated: 06/19/24 0703     Glucose 148 mg/dL      BUN 13 mg/dL      Creatinine 1.17 mg/dL      Sodium 132 mmol/L      Potassium 4.1 mmol/L      Chloride 96 mmol/L      CO2 22.7 mmol/L      Calcium 9.5 mg/dL      Total Protein 7.3 g/dL      Albumin 4.2 g/dL      ALT (SGPT) 10 U/L      AST (SGOT) 13 U/L      Alkaline Phosphatase 39 U/L      Total Bilirubin 0.4 mg/dL      Globulin 3.1 gm/dL      A/G Ratio 1.4 g/dL      BUN/Creatinine Ratio 11.1     Anion Gap 13.3 mmol/L      eGFR 50.0 mL/min/1.73     Narrative:      GFR Normal >60  Chronic Kidney Disease <60  Kidney Failure <15    The GFR formula is only valid for  adults with stable renal function between ages 18 and 70.    CBC (No Diff) [[id number]]  (Abnormal) Collected: 06/19/24 0631    Specimen: Blood from Arm, Left Updated: 06/19/24 0647     WBC 5.72 10*3/mm3      RBC 4.53 10*6/mm3      Hemoglobin 13.5 g/dL      Hematocrit 38.9 %      MCV 85.9 fL      MCH 29.8 pg      MCHC 34.7 g/dL      RDW 11.9 %      RDW-SD 38.0 fl      MPV 8.9 fL      Platelets 341 10*3/mm3     CBC & Differential [[id number]]  (Abnormal) Collected: 06/19/24 1808    Specimen: Blood Updated: 06/19/24 1817    Narrative:      The following orders were created for panel order CBC & Differential.  Procedure                               Abnormality         Status                     ---------                               -----------         ------                     CBC Auto Differential[[id number]]        Abnormal            Final result                 Please view results for these tests on the individual orders.    Comprehensive Metabolic Panel [[id number]]  (Abnormal) Collected: 06/19/24 1808    Specimen: Blood Updated: 06/19/24 1837     Glucose 185 mg/dL      BUN 14 mg/dL      Creatinine 1.11 mg/dL      Sodium 134 mmol/L      Potassium 3.6 mmol/L      Comment: Slight hemolysis detected by analyzer. Result may be falsely elevated.        Chloride 95 mmol/L      CO2 23.7 mmol/L      Calcium 9.7 mg/dL      Total Protein 7.2 g/dL      Albumin 4.5 g/dL      ALT (SGPT) 12 U/L      AST (SGOT) 18 U/L      Alkaline Phosphatase 40 U/L      Total Bilirubin 0.4 mg/dL      Globulin 2.7 gm/dL      A/G Ratio 1.7 g/dL      BUN/Creatinine Ratio 12.6     Anion Gap 15.3 mmol/L      eGFR 53.3 mL/min/1.73     Narrative:      GFR Normal >60  Chronic Kidney Disease <60  Kidney Failure <15    The GFR formula is only valid for adults with stable renal function between ages 18 and 70.    BNP [[id number]]  (Normal) Collected: 06/19/24 1808    Specimen: Blood Updated: 06/19/24 1835     proBNP 318.8 pg/mL     Narrative:      This  assay is used as an aid in the diagnosis of individuals suspected of having heart failure. It can be used as an aid in the diagnosis of acute decompensated heart failure (ADHF) in patients presenting with signs and symptoms of ADHF to the emergency department (ED). In addition, NT-proBNP of <300 pg/mL indicates ADHF is not likely.    Age Range Result Interpretation  NT-proBNP Concentration (pg/mL:      <50             Positive            >450                   Gray                 300-450                    Negative             <300    50-75           Positive            >900                  Gray                300-900                  Negative            <300      >75             Positive            >1800                  Gray                300-1800                  Negative            <300    Single High Sensitivity Troponin T [[id number]]  (Normal) Collected: 06/19/24 1808    Specimen: Blood Updated: 06/19/24 1837     HS Troponin T <6 ng/L     Narrative:      High Sensitive Troponin T Reference Range:  <14.0 ng/L- Negative Female for AMI  <22.0 ng/L- Negative Male for AMI  >=14 - Abnormal Female indicating possible myocardial injury.  >=22 - Abnormal Male indicating possible myocardial injury.   Clinicians would have to utilize clinical acumen, EKG, Troponin, and serial changes to determine if it is an Acute Myocardial Infarction or myocardial injury due to an underlying chronic condition.         CBC Auto Differential [[id number]]  (Abnormal) Collected: 06/19/24 1808    Specimen: Blood Updated: 06/19/24 1817     WBC 8.12 10*3/mm3      RBC 4.55 10*6/mm3      Hemoglobin 13.6 g/dL      Hematocrit 39.2 %      MCV 86.2 fL      MCH 29.9 pg      MCHC 34.7 g/dL      RDW 12.1 %      RDW-SD 38.1 fl      MPV 8.7 fL      Platelets 334 10*3/mm3      Neutrophil % 61.9 %      Lymphocyte % 28.2 %      Monocyte % 9.0 %      Eosinophil % 0.5 %      Basophil % 0.2 %      Immature Grans % 0.2 %      Neutrophils, Absolute 5.02  10*3/mm3      Lymphocytes, Absolute 2.29 10*3/mm3      Monocytes, Absolute 0.73 10*3/mm3      Eosinophils, Absolute 0.04 10*3/mm3      Basophils, Absolute 0.02 10*3/mm3      Immature Grans, Absolute 0.02 10*3/mm3      nRBC 0.0 /100 WBC              Imaging:    CT Head Without Contrast    Result Date: 6/19/2024  CT HEAD WO CONTRAST Date of Exam: 6/19/2024 5:09 PM EDT Indication: hypertension. Comparison: None available. Technique: Axial CT images were obtained of the head without contrast administration.  Reconstructed coronal and sagittal images were also obtained. Automated exposure control and iterative construction methods were used. Findings: No large territory infarct. There is no evidence of hemorrhage. No mass effect, edema or midline shift Moderate periventricular and subcortical white matter hypodensities, nonspecific but most likely represents chronic small vessel ischemic changes. No extra-axial fluid collection. Prominent ventricular system secondary to chronic parenchymal volume loss. Nonspecific small hyperdensity noted within the posterior aspect of the left lateral ventricle measuring approximately 0.7 x 0.5 cm (for example image 36 of 201). The visualized orbits are unremarkable. The visualized paranasal sinuses and mastoid air cells are clear. The visualized soft tissues are unremarkable. No acute osseous abnormality.     Impression: No definite acute intracranial abnormality. Small hyperdensity within the posterior aspect of the left lateral ventricle most likely represents a small choroid plexus. Less likely, this may also represent a small mass. Much less likely, this may also represent a small amount of intraventricular hemorrhage. If symptoms persist, please consider further evaluation with dedicated MRI of the brain with and without contrast Electronically Signed: Darrick Cao DO  6/19/2024 5:24 PM EDT  Workstation ID: [id number]    XR Chest 1 View    Result Date: 6/19/2024  XR CHEST 1 VW  Date of Exam: 6/19/2024 5:05 PM EDT Indication: SOA Triage Protocol Comparison: Chest radiograph performed on December 26, 2021 Findings: Overlying monitoring wires limit diagnostic sensitivity. No focal consolidation or effusion.  There is no evidence of pneumothorax.  The pulmonary vasculature appears within normal limits.  The cardiac and mediastinal silhouette appear unremarkable.  No acute osseous abnormality identified.     Impression: No radiographic evidence of acute chest process. Electronically Signed: Juan Carrizales MD  6/19/2024 5:23 PM EDT  Workstation ID: [id number]       Differential Diagnosis and Discussion:    Paresthesia: Differential diagnosis includes but is not limited to acute stroke, electrolyte imbalance, anxiety, radiculopathy, autoimmune disorders, and endocrine disorders.  Weakness: Based on the patient's history, signs, and symptoms, the diffential diagnosis includes but is not limited to meningitis, stroke, sepsis, subarachnoid hemorrhage, intracranial bleeding, encephalitis, acute uti, dehydration, MS, myasthenia gravis, Guillan [place], migraine variant, neuromuscular disorders vertigo, electrolyte imbalance, and metabolic disorders.    All labs were reviewed and interpreted by me.  EKG was interpreted by me.    MDM     Amount and/or Complexity of Data Reviewed  Clinical lab tests: reviewed  Tests in the radiology section of CPT®: reviewed  Tests in the medicine section of CPT®: reviewed                 Patient Care Considerations:    MRI: I considered ordering an MRI however this can be performed as an inpatient.      Consultants/Shared Management Plan:    Consultant: I have discussed the case with Dr Moran who states he will admit the patient    Social Determinants of Health:    Patient is unable to carry out activities of daily life. Escalation of care is necessary.       Disposition and Care Coordination:    Admit:   Through independent evaluation of the patient's history, physical,  and imperical data, the patient meets criteria for inpatient admission to the hospital.    I have explained discharge medications and the need for follow up with the patient/caretakers. This was also printed in the discharge instructions. Patient was discharged with the following medications and follow up:      Medication List      No changes were made to your prescriptions during this visit.      No follow-up provider specified.     Final diagnoses:   Facial weakness   Uncontrolled hypertension        ED Disposition       ED Disposition   Decision to Admit    Condition   --    Comment   Level of Care: Telemetry [5]   Diagnosis: HTN (hypertension) [392561]   Admitting Physician: YUDITH GUILLEN [[id number]]   Attending Physician: YUDITH GUILLEN [[id number]]                 This medical record created using voice recognition software.             Raoul Lobo, DO  06/20/24 0238

## 2024-06-19 NOTE — PLAN OF CARE
Goal Outcome Evaluation:  Plan of Care Reviewed With: patient           Outcome Evaluation: Pt is alert and orient x4.  Pt denies any pain/discomfort.  VS WNL for pt.  Pt to discharge home this shift.  Pt notified son via phone

## 2024-06-19 NOTE — Clinical Note
Level of Care: Telemetry [5]   Diagnosis: Poorly-controlled hypertension [7517169]   Admitting Physician: YUDITH GUILLEN [[id number]]   Attending Physician: YUDITH GUILLEN [[id number]]

## 2024-06-19 NOTE — PLAN OF CARE
Goal Outcome Evaluation:           Progress: no change  Outcome Evaluation: Vss. Complain of Left side of face tightening and being painful, contacted Jose, ordered one time dose of prednisone. Also given tylenol prn, educated pt to apply warm washcloth to side of face and perform facial exercises. Pt reports improvement in symptoms after. Also given xanax and melatonin prn to help sleep. Pt rested comfortably throughout the night. No other complaints at this time. Continuing plan of care.

## 2024-06-19 NOTE — OUTREACH NOTE
Prep Survey      Flowsheet Row Responses   [facility] facility patient discharged from? [facility]   Is LACE score < 7 ? No   Eligibility Readm Mgmt   Discharge diagnosis Dehydration with hyponatremia   Does the patient have one of the following disease processes/diagnoses(primary or secondary)? Other   Prep survey completed? Yes            Kasia DUARTE - Registered Nurse

## 2024-06-20 ENCOUNTER — READMISSION MANAGEMENT (OUTPATIENT)
Dept: CALL CENTER | Facility: HOSPITAL | Age: 69
End: 2024-06-20
Payer: MEDICARE

## 2024-06-20 VITALS
TEMPERATURE: 98.8 F | BODY MASS INDEX: 20.4 KG/M2 | DIASTOLIC BLOOD PRESSURE: 78 MMHG | OXYGEN SATURATION: 98 % | HEIGHT: 61 IN | SYSTOLIC BLOOD PRESSURE: 127 MMHG | RESPIRATION RATE: 18 BRPM | HEART RATE: 80 BPM | WEIGHT: 108.03 LBS

## 2024-06-20 PROCEDURE — G0378 HOSPITAL OBSERVATION PER HR: HCPCS

## 2024-06-20 PROCEDURE — 25010000002 LABETALOL 5 MG/ML SOLUTION: Performed by: INTERNAL MEDICINE

## 2024-06-20 RX ORDER — POTASSIUM CHLORIDE 750 MG/1
40 CAPSULE, EXTENDED RELEASE ORAL ONCE
Status: COMPLETED | OUTPATIENT
Start: 2024-06-20 | End: 2024-06-20

## 2024-06-20 RX ORDER — CLONIDINE HYDROCHLORIDE 0.1 MG/1
0.2 TABLET ORAL EVERY 12 HOURS SCHEDULED
Status: DISCONTINUED | OUTPATIENT
Start: 2024-06-20 | End: 2024-06-20 | Stop reason: HOSPADM

## 2024-06-20 RX ORDER — AMLODIPINE BESYLATE 5 MG/1
5 TABLET ORAL
Status: DISCONTINUED | OUTPATIENT
Start: 2024-06-20 | End: 2024-06-20 | Stop reason: HOSPADM

## 2024-06-20 RX ORDER — METOPROLOL TARTRATE 50 MG/1
200 TABLET, FILM COATED ORAL DAILY
Status: DISCONTINUED | OUTPATIENT
Start: 2024-06-20 | End: 2024-06-20 | Stop reason: HOSPADM

## 2024-06-20 RX ADMIN — POTASSIUM CHLORIDE 40 MEQ: 750 CAPSULE, EXTENDED RELEASE ORAL at 09:29

## 2024-06-20 RX ADMIN — AMLODIPINE BESYLATE 5 MG: 5 TABLET ORAL at 08:04

## 2024-06-20 RX ADMIN — CLONIDINE HYDROCHLORIDE 0.2 MG: 0.1 TABLET ORAL at 08:40

## 2024-06-20 RX ADMIN — METOPROLOL TARTRATE 200 MG: 50 TABLET, FILM COATED ORAL at 08:40

## 2024-06-20 RX ADMIN — FAMOTIDINE 40 MG: 20 TABLET ORAL at 08:04

## 2024-06-20 NOTE — OUTREACH NOTE
Prep Survey      Flowsheet Row Responses   [facility] facility patient discharged from? [facility]   Is LACE score < 7 ? Yes   Eligibility Not Eligible   What are the reasons patient is not eligible? Other  [ANTERIOR CERVICAL DISCECTOMY W/ FUSION]   Does the patient have one of the following disease processes/diagnoses(primary or secondary)? Other   Prep survey completed? Yes            ANGUS CELIS - Registered Nurse

## 2024-06-20 NOTE — OUTREACH NOTE
Medical Week 1 Survey      Flowsheet Row Responses   [facility] patient discharged from? [facility]   Does the patient have one of the following disease processes/diagnoses(primary or secondary)? Other   Week 1 attempt successful? No   Unsuccessful attempts Attempt 1   Revoke Readmitted            Zina TAYLOR - Registered Nurse

## 2024-06-20 NOTE — PLAN OF CARE
Goal Outcome Evaluation:                      Alert and oriented x4.  BP elevated this morning; additional orders placed by Dr. Moran.  BP improved this afternoon.  Discharged home with self-care.  Discharge instructions provided; verbalized understanding of discharge instructions.  IV removed with tip intact.

## 2024-06-20 NOTE — CONSULTS
[facility]   Consult Note    Patient Name: Debra Barnett  : 1955  MRN: [id number]  Primary Care Physician:  Ivan Clemente MD  Referring Physician: No ref. provider found  Date of admission: 2024    Subjective   Subjective     Reason for Consult/ Chief Complaint: Hypertension    HPI:  Debra Barnett is a 69 y.o. female with past medical history significant for Berman esophagitis diabetes hyperlipidemia kidney stone hypertension who was just discharged from the hospital around noon time and patient came back at night complaining that her Bell's palsy got worse.  When I spoke to the patient she said my left side of the face is hurting bad and it happens every few months she did not complain of any weakness of her facial muscles.  When patient was discharged her blood pressure was under good control and because of her mental stress her blood pressure in the emergency room was around 200 and ER physician did not feel comfortable sending patient home and requested me to observe the patient in the hospital.  She had a CT scan of the head which was negative and we cannot do MRI because she has dental implant and patient says she will not go for MRI.  When I see the patient she is fully awake alert oriented she has no new neurodeficit and her facial pain is resolved.  Her blood pressure is under better control.  Patient was admitted as observation    Review of Systems  All review of systems negative except as given below.    Personal History     Past Medical History:   Diagnosis Date    Anemia     Berman's esophagus with low grade dysplasia     Cardiac murmur     Corns and callus     DM (diabetes mellitus)     Fatty liver disease, nonalcoholic     GERD (gastroesophageal reflux disease)     HBP (high blood pressure)     Heartburn     Hepatitis A 1971    Hiatal hernia     Hyperlipidemia     Renal stone        Past Surgical History:   Procedure Laterality Date    APPENDECTOMY      CHOLECYSTECTOMY       COLONOSCOPY  08/11/2016    DR MORAN    COLONOSCOPY  07/28/2020    ENDOSCOPY  09/15/2016    DR MORAN    ENDOSCOPY  04/04/2018    FOOT SURGERY      HERNIA REPAIR      HYSTERECTOMY      LAPAROSCOPIC TUBAL LIGATION      TUBAL ABDOMINAL LIGATION         Family History: family history includes Breast cancer (age of onset: 60) in her mother; Diabetes in her mother; Heart attack in her father; Heart disease in her father and mother; Lung cancer (age of onset: 60) in her brother; Urolithiasis in her sister. Otherwise pertinent FHx was reviewed and not pertinent to current issue.    Social History:  reports that she has quit smoking. She has never used smokeless tobacco. She reports that she does not currently use alcohol. She reports that she does not use drugs.    Home Medications:  Garlic, Vitamin E, Zinc, amLODIPine, ascorbic acid, aspirin, cholecalciferol, cloNIDine, cyanocobalamin, fish oil, metFORMIN, metoprolol tartrate, multivitamin with minerals, and psyllium    Allergies:  Allergies   Allergen Reactions    Gabapentin Unknown - High Severity, Dizziness, GI Intolerance and Unknown (See Comments)     Other reaction(s): Dizzy      Citalopram Hallucinations    Cyclobenzaprine Hallucinations    Hydroxyzine Hcl Mental Status Change       Objective    Objective     Vitals:   Temp:  [97.7 °F (36.5 °C)-98.6 °F (37 °C)] 98.1 °F (36.7 °C)  Heart Rate:  [] 97  Resp:  [16-22] 20  BP: (151-236)/() 151/100    Physical Exam:             Constitutional:         Awake, alert responsive, conversant, no obvious distress   Eyes:                       PERRLA, sclerae anicteric, no conjunctival injection   HEENT:                   Moist mucous membranes, no nasal or eye discharge, no throat congestion   Neck:                      Supple, no thyromegaly, no lymphadenopathy, trachea midline, no elevated JVD   Respiratory:           Clear to auscultation bilaterally, nonlabored respirations    Cardiovascular:     RRR, no  murmurs, rubs, or gallops, palpable pedal pulses bilaterally, No bilateral ankle edema   Gastrointestinal:   Positive bowel sounds, soft, nontender, non-distended, no organomegaly   Musculoskeletal:  No clubbing or cyanosis to extremities, muscle wasting, joint swelling, muscle weakness   Psychiatric:              Appropriate affect, cooperative   Neurologic:            Awake alert, oriented x 3, strength symmetric in all extremities, Cranial Nerves grossly intact to confrontation, speech clear   Skin:                      No rashes, bruising, skin ulcers, petechiae or ecchymosis    Result Review    Result Review:  I have personally reviewed the results from the time of this admission to 6/20/2024 08:40 EDT and agree with these findings:  []  Laboratory  []  Microbiology  []  Radiology  []  EKG/Telemetry   []  Cardiology/Vascular   []  Pathology  []  Old records  []  Other:    Results from last 7 days   Lab Units 06/19/24  1808 06/19/24  0631 06/16/24  1102   WBC 10*3/mm3 8.12 5.72 7.01   HEMOGLOBIN g/dL 13.6 13.5 13.3   PLATELETS 10*3/mm3 334 341 346     Results from last 7 days   Lab Units 06/19/24  1808 06/19/24  0631 06/18/24  0710 06/16/24  1137   SODIUM mmol/L 134* 132* 134* 131*   POTASSIUM mmol/L 3.6 4.1 3.9 4.1   CHLORIDE mmol/L 95* 96* 99 92*   CO2 mmol/L 23.7 22.7 20.9* 23.5   ANION GAP mmol/L 15.3* 13.3 14.1 15.5*   BUN mg/dL 14 13 9 18   CREATININE mg/dL 1.11* 1.17* 1.09* 1.23*   GLUCOSE mg/dL 185* 148* 130* 128*       Assessment & Plan   Assessment / Plan     Active Hospital Problems:  Active Hospital Problems    Diagnosis     **Poorly-controlled hypertension     HTN (hypertension)        Plan:   Restart antihypertensive medications  Observe overnight  1 dose of Lasix    Electronically signed by Racheal Moran MD, 06/20/24, 8:40 AM EDT.

## 2024-06-20 NOTE — H&P
[facility]   HISTORY AND PHYSICAL    Patient Name: Debra Barnett  : 1955  MRN: [id number]  Primary Care Physician:  Ivan Clemente MD  Date of admission: 2024    Subjective   Subjective     Chief Complaint: Facial pain    HPI:    Debra Barnett is a 69 y.o. female Debra Barnett is a 69 y.o. female with past medical history significant for Berman esophagitis diabetes hyperlipidemia kidney stone hypertension who was just discharged from the hospital around noon time and patient came back at night complaining that her Bell's palsy got worse.  When I spoke to the patient she said my left side of the face is hurting bad and it happens every few months she did not complain of any weakness of her facial muscles.  When patient was discharged her blood pressure was under good control and because of her mental stress her blood pressure in the emergency room was around 200 and ER physician did not feel comfortable sending patient home and requested me to observe the patient in the hospital.  She had a CT scan of the head which was negative and we cannot do MRI because she has dental implant and patient says she will not go for MRI.  When I see the patient she is fully awake alert oriented she has no new neurodeficit and her facial pain is resolved.  Her blood pressure is under better control.  Patient was admitted as observation    Review of Systems  All review of systems negative except as in subjective complaints:  Personal History     Past Medical History:   Diagnosis Date    Anemia     Berman's esophagus with low grade dysplasia     Cardiac murmur     Corns and callus     DM (diabetes mellitus)     Fatty liver disease, nonalcoholic     GERD (gastroesophageal reflux disease)     HBP (high blood pressure)     Heartburn     Hepatitis A 1971    Hiatal hernia     Hyperlipidemia     Renal stone        Past Surgical History:   Procedure Laterality Date    APPENDECTOMY      CHOLECYSTECTOMY       COLONOSCOPY  08/11/2016    DR MORAN    COLONOSCOPY  07/28/2020    ENDOSCOPY  09/15/2016    DR MORAN    ENDOSCOPY  04/04/2018    FOOT SURGERY      HERNIA REPAIR      HYSTERECTOMY      LAPAROSCOPIC TUBAL LIGATION      TUBAL ABDOMINAL LIGATION         Family History: family history includes Breast cancer (age of onset: 60) in her mother; Diabetes in her mother; Heart attack in her father; Heart disease in her father and mother; Lung cancer (age of onset: 60) in her brother; Urolithiasis in her sister. Otherwise pertinent FHx was reviewed and not pertinent to current issue.    Social History:  reports that she has quit smoking. She has never used smokeless tobacco. She reports that she does not currently use alcohol. She reports that she does not use drugs.    Home Medications:  Garlic, Vitamin E, Zinc, amLODIPine, ascorbic acid, aspirin, cholecalciferol, cloNIDine, cyanocobalamin, fish oil, metFORMIN, metoprolol tartrate, multivitamin with minerals, and psyllium      Allergies:  Allergies   Allergen Reactions    Gabapentin Unknown - High Severity, Dizziness, GI Intolerance and Unknown (See Comments)     Other reaction(s): Dizzy      Citalopram Hallucinations    Cyclobenzaprine Hallucinations    Hydroxyzine Hcl Mental Status Change       Objective   Objective     Vitals:   Temp:  [97.7 °F (36.5 °C)-98.6 °F (37 °C)] 98.1 °F (36.7 °C)  Heart Rate:  [] 97  Resp:  [16-22] 20  BP: (151-236)/() 151/100  Physical Exam               Constitutional:         Awake, alert responsive, conversant, no obvious distress   Eyes:                       PERRLA, sclerae anicteric, no conjunctival injection   HEENT:                   Moist mucous membranes, no nasal or eye discharge, no throat congestion   Neck:                      Supple, no thyromegaly, no lymphadenopathy, trachea midline, no elevated JVD   Respiratory:           Clear to auscultation bilaterally, nonlabored respirations    Cardiovascular:     RRR, no  murmurs, rubs, or gallops, palpable pedal pulses bilaterally,No bilateral ankle edema   Gastrointestinal:   Positive bowel sounds, soft, nontender, nondistended, no organomegaly   Musculoskeletal:   No clubbing or cyanosis to extremities, muscle wasting, joint swelling, muscle weakness   Psychiatric:             Appropriate affect, cooperative   Neurologic:            Awake alert ,oriented x 3, strength symmetric in all extremities, Cranial Nerves grossly intact to confrontation, speech clear   Skin:                      No rashes, bruising, skin ulcers, petechiae or ecchymosis    Result Review    Result Review:  I have personally reviewed the results from the time of this admission to 6/20/2024 10:01 EDT and agree with these findings:  []  Laboratory  []  Microbiology  []  Radiology  []  EKG/Telemetry   []  Cardiology/Vascular   []  Pathology  []  Old records  []  Other:    Results from last 7 days   Lab Units 06/19/24  1808 06/19/24  0631 06/16/24  1102   WBC 10*3/mm3 8.12 5.72 7.01   HEMOGLOBIN g/dL 13.6 13.5 13.3   PLATELETS 10*3/mm3 334 341 346     Results from last 7 days   Lab Units 06/19/24  1808 06/19/24  0631 06/18/24  0710 06/16/24  1137   SODIUM mmol/L 134* 132* 134* 131*   POTASSIUM mmol/L 3.6 4.1 3.9 4.1   CHLORIDE mmol/L 95* 96* 99 92*   CO2 mmol/L 23.7 22.7 20.9* 23.5   ANION GAP mmol/L 15.3* 13.3 14.1 15.5*   BUN mg/dL 14 13 9 18   CREATININE mg/dL 1.11* 1.17* 1.09* 1.23*   GLUCOSE mg/dL 185* 148* 130* 128*         Assessment & Plan   Assessment / Plan     Active Hospital Problems:  Active Hospital Problems    Diagnosis     **Poorly-controlled hypertension     HTN (hypertension)        Plan:   Adjusted antihypertensive medications  Observe overnight  1 dose of Lasix    VTE Prophylaxis:  Pharmacologic VTE prophylaxis orders are present.        CODE STATUS:    Code Status (Patient has no pulse and is not breathing): CPR (Attempt to Resuscitate)  Medical Interventions (Patient has pulse or is  breathing): Full Support    Admission Status:  I believe this patient meets observation status.    Electronically signed by Racheal Moran MD, 06/20/24, 10:01 AM EDT.

## 2024-06-20 NOTE — DISCHARGE SUMMARY
[facility]   DISCHARGE SUMMARY    Patient Name: Debra Barnett  : 1955  MRN: [id number]    Date of Admission: 2024  Date of Discharge: 2024  Primary Care Physician: Ivan Clemente MD    Consults       Date and Time Order Name Status Description    2024  8:10 PM IP General Consult (Use specialty-specific consult if known)      2024  8:06 PM IP General Consult (Use specialty-specific consult if known)              Hospital Course     Presenting Problem:   Facial weakness [R29.810]  HTN (hypertension) [I10]  Uncontrolled hypertension [I10]  Poorly-controlled hypertension [I10]    Active and resolved problems  Principal Problem:    Poorly-controlled hypertension  Overview:  Active Problems:    HTN (hypertension)  Overview:  Resolved Problems:    * No resolved hospital problems. *      Hospital Course:  Debra Barnett is a 69 y.o. female patient was admitted as observation      Vital Signs:  Temp:  [97.7 °F (36.5 °C)-98.6 °F (37 °C)] 98.1 °F (36.7 °C)  Heart Rate:  [] 97  Resp:  [16-22] 20  BP: (151-236)/() 151/100      Discharge Details        Discharge Medications        Continue These Medications        Instructions Start Date   amLODIPine 5 MG tablet  Commonly known as: NORVASC   5 mg, Oral, Every Morning      ascorbic acid 1000 MG tablet  Commonly known as: VITAMIN C   1,000 mg, Oral, Every Morning      aspirin 81 MG EC tablet   Take 1 tablet by mouth Every Night.      cholecalciferol 25 MCG (1000 UT) tablet  Commonly known as: VITAMIN D3   1,000 Units, Oral, Daily      cloNIDine 0.2 MG tablet  Commonly known as: CATAPRES   0.2 mg, Oral, Nightly      cyanocobalamin 1000 MCG tablet  Commonly known as: VITAMIN B-12   1,000 mcg, Oral, Every Morning      fish oil 1200 MG capsule capsule   1,200 mg, Oral, Daily With Breakfast      Garlic 400 MG tablet delayed-release   400 mg, Oral, Daily      metFORMIN 1000 MG tablet  Commonly known as: GLUCOPHAGE   1,000 mg, 2 Times  Daily With Meals      metoprolol tartrate 100 MG tablet  Commonly known as: LOPRESSOR   100 mg, Oral, 2 Times Daily      multivitamin with minerals tablet tablet   1 tablet, Oral, Daily      psyllium 0.52 g capsule  Commonly known as: METAMUCIL   1 capsule, Oral, Daily      Vitamin E 450 MG (1000 UT) capsule   450 mg, Oral, Daily      Zinc 50 MG tablet   Take 1 tablet by mouth Every Morning.               Allergies   Allergen Reactions    Gabapentin Unknown - High Severity, Dizziness, GI Intolerance and Unknown (See Comments)     Other reaction(s): Dizzy      Citalopram Hallucinations    Cyclobenzaprine Hallucinations    Hydroxyzine Hcl Mental Status Change         Discharge Disposition:  Home or Self Care    Diet:        Discharge Activity:         CODE STATUS:    Code Status and Medical Interventions:   Ordered at: 06/19/24 2028     Code Status (Patient has no pulse and is not breathing):    CPR (Attempt to Resuscitate)     Medical Interventions (Patient has pulse or is breathing):    Full Support         No future appointments.        Time spent on Discharge including face to face service:   minutes    Electronically signed by Racheal Moran MD, 06/20/24, 10:04 AM EDT.

## 2024-06-20 NOTE — PLAN OF CARE
Goal Outcome Evaluation:  Plan of Care Reviewed With: patient           Outcome Evaluation: New admission from ED this shift. Blood pressure trending down since admission. left facial droop due to bells palsy. plan of care ongoing.

## 2024-06-24 ENCOUNTER — TRANSCRIBE ORDERS (OUTPATIENT)
Dept: ADMINISTRATIVE | Facility: HOSPITAL | Age: 69
End: 2024-06-24
Payer: MEDICARE

## 2024-06-24 DIAGNOSIS — R63.4 ABNORMAL WEIGHT LOSS: Primary | ICD-10-CM

## 2024-06-24 LAB
QT INTERVAL: 360 MS
QTC INTERVAL: 435 MS

## 2025-04-07 ENCOUNTER — TRANSCRIBE ORDERS (OUTPATIENT)
Dept: ULTRASOUND IMAGING | Facility: HOSPITAL | Age: 70
End: 2025-04-07
Payer: MEDICARE

## 2025-04-07 DIAGNOSIS — Z12.31 VISIT FOR SCREENING MAMMOGRAM: Primary | ICD-10-CM

## 2025-04-10 ENCOUNTER — TRANSCRIBE ORDERS (OUTPATIENT)
Dept: ADMINISTRATIVE | Facility: HOSPITAL | Age: 70
End: 2025-04-10
Payer: MEDICARE

## 2025-04-10 DIAGNOSIS — N18.31 CHRONIC KIDNEY DISEASE, STAGE 3A: Primary | ICD-10-CM

## 2025-04-28 ENCOUNTER — HOSPITAL ENCOUNTER (OUTPATIENT)
Dept: MAMMOGRAPHY | Facility: HOSPITAL | Age: 70
Discharge: HOME | End: 2025-04-28
Admitting: INTERNAL MEDICINE
Payer: MEDICARE

## 2025-04-28 DIAGNOSIS — Z12.31 VISIT FOR SCREENING MAMMOGRAM: ICD-10-CM

## 2025-04-28 PROCEDURE — 77063 BREAST TOMOSYNTHESIS BI: CPT

## 2025-04-28 PROCEDURE — 77067 SCR MAMMO BI INCL CAD: CPT

## 2025-07-03 ENCOUNTER — HOSPITAL ENCOUNTER (OUTPATIENT)
Dept: ULTRASOUND IMAGING | Facility: HOSPITAL | Age: 70
Discharge: HOME | End: 2025-07-03
Payer: MEDICARE

## 2025-07-03 ENCOUNTER — LAB (OUTPATIENT)
Dept: LAB | Facility: HOSPITAL | Age: 70
End: 2025-07-03
Payer: MEDICARE

## 2025-07-03 ENCOUNTER — TRANSCRIBE ORDERS (OUTPATIENT)
Dept: LAB | Facility: HOSPITAL | Age: 70
End: 2025-07-03
Payer: MEDICARE

## 2025-07-03 DIAGNOSIS — I10 HYPERTENSION, ESSENTIAL: Primary | ICD-10-CM

## 2025-07-03 DIAGNOSIS — N18.31 CHRONIC KIDNEY DISEASE, STAGE 3A: ICD-10-CM

## 2025-07-03 DIAGNOSIS — N18.30 STAGE 3 CHRONIC KIDNEY DISEASE, UNSPECIFIED WHETHER STAGE 3A OR 3B CKD: ICD-10-CM

## 2025-07-03 DIAGNOSIS — I10 HYPERTENSION, ESSENTIAL: ICD-10-CM

## 2025-07-03 LAB
ALBUMIN SERPL-MCNC: 4.4 G/DL (ref 3.5–5.2)
ALBUMIN UR-MCNC: 42.7 MG/DL
ALBUMIN/GLOB SERPL: 1.6 G/DL
ALP SERPL-CCNC: 35 U/L (ref 39–117)
ALT SERPL W P-5'-P-CCNC: 7 U/L (ref 1–33)
ANION GAP SERPL CALCULATED.3IONS-SCNC: 12 MMOL/L (ref 5–15)
AST SERPL-CCNC: 11 U/L (ref 1–32)
BACTERIA UR QL AUTO: NORMAL /HPF
BASOPHILS # BLD AUTO: 0.03 10*3/MM3 (ref 0–0.2)
BASOPHILS NFR BLD AUTO: 0.5 % (ref 0–1.5)
BILIRUB SERPL-MCNC: 0.5 MG/DL (ref 0–1.2)
BILIRUB UR QL STRIP: NEGATIVE
BUN SERPL-MCNC: 23 MG/DL (ref 8–23)
BUN/CREAT SERPL: 17.6 (ref 7–25)
CALCIUM SPEC-SCNC: 9.7 MG/DL (ref 8.6–10.5)
CHLORIDE SERPL-SCNC: 99 MMOL/L (ref 98–107)
CLARITY UR: CLEAR
CO2 SERPL-SCNC: 24 MMOL/L (ref 22–29)
COLOR UR: YELLOW
CREAT SERPL-MCNC: 1.31 MG/DL (ref 0.57–1)
CREAT UR-MCNC: 107.7 MG/DL
CREAT UR-MCNC: 122.3 MG/DL
DEPRECATED RDW RBC AUTO: 42.8 FL (ref 37–54)
EGFRCR SERPLBLD CKD-EPI 2021: 43.4 ML/MIN/1.73
EOSINOPHIL # BLD AUTO: 0.09 10*3/MM3 (ref 0–0.4)
EOSINOPHIL NFR BLD AUTO: 1.5 % (ref 0.3–6.2)
ERYTHROCYTE [DISTWIDTH] IN BLOOD BY AUTOMATED COUNT: 12.6 % (ref 12.3–15.4)
GLOBULIN UR ELPH-MCNC: 2.8 GM/DL
GLUCOSE SERPL-MCNC: 93 MG/DL (ref 65–99)
GLUCOSE UR STRIP-MCNC: NEGATIVE MG/DL
HCT VFR BLD AUTO: 37.1 % (ref 34–46.6)
HGB BLD-MCNC: 12.4 G/DL (ref 12–15.9)
HGB UR QL STRIP.AUTO: NEGATIVE
HYALINE CASTS UR QL AUTO: NORMAL /LPF
IMM GRANULOCYTES # BLD AUTO: 0.01 10*3/MM3 (ref 0–0.05)
IMM GRANULOCYTES NFR BLD AUTO: 0.2 % (ref 0–0.5)
KETONES UR QL STRIP: NEGATIVE
LEUKOCYTE ESTERASE UR QL STRIP.AUTO: NEGATIVE
LYMPHOCYTES # BLD AUTO: 1.98 10*3/MM3 (ref 0.7–3.1)
LYMPHOCYTES NFR BLD AUTO: 33.4 % (ref 19.6–45.3)
MCH RBC QN AUTO: 30.8 PG (ref 26.6–33)
MCHC RBC AUTO-ENTMCNC: 33.4 G/DL (ref 31.5–35.7)
MCV RBC AUTO: 92.3 FL (ref 79–97)
MICROALBUMIN/CREAT UR: 396.5 MG/G (ref 0–29)
MONOCYTES # BLD AUTO: 0.43 10*3/MM3 (ref 0.1–0.9)
MONOCYTES NFR BLD AUTO: 7.3 % (ref 5–12)
NEUTROPHILS NFR BLD AUTO: 3.39 10*3/MM3 (ref 1.7–7)
NEUTROPHILS NFR BLD AUTO: 57.1 % (ref 42.7–76)
NITRITE UR QL STRIP: NEGATIVE
NRBC BLD AUTO-RTO: 0 /100 WBC (ref 0–0.2)
PH UR STRIP.AUTO: 6 [PH] (ref 5–8)
PLATELET # BLD AUTO: 331 10*3/MM3 (ref 140–450)
PMV BLD AUTO: 10.3 FL (ref 6–12)
POTASSIUM SERPL-SCNC: 4.3 MMOL/L (ref 3.5–5.2)
PROT ?TM UR-MCNC: 73.1 MG/DL
PROT SERPL-MCNC: 7.2 G/DL (ref 6–8.5)
PROT UR QL STRIP: ABNORMAL
PROT/CREAT UR: 0.6 MG/G{CREAT}
RBC # BLD AUTO: 4.02 10*6/MM3 (ref 3.77–5.28)
RBC # UR STRIP: NORMAL /HPF
REF LAB TEST METHOD: NORMAL
SODIUM SERPL-SCNC: 135 MMOL/L (ref 136–145)
SP GR UR STRIP: 1.02 (ref 1–1.03)
SQUAMOUS #/AREA URNS HPF: NORMAL /HPF
UROBILINOGEN UR QL STRIP: ABNORMAL
WBC # UR STRIP: NORMAL /HPF
WBC NRBC COR # BLD AUTO: 5.93 10*3/MM3 (ref 3.4–10.8)

## 2025-07-03 PROCEDURE — 80053 COMPREHEN METABOLIC PANEL: CPT

## 2025-07-03 PROCEDURE — 81001 URINALYSIS AUTO W/SCOPE: CPT

## 2025-07-03 PROCEDURE — 76775 US EXAM ABDO BACK WALL LIM: CPT

## 2025-07-03 PROCEDURE — 36415 COLL VENOUS BLD VENIPUNCTURE: CPT

## 2025-07-03 PROCEDURE — 82570 ASSAY OF URINE CREATININE: CPT

## 2025-07-03 PROCEDURE — 82043 UR ALBUMIN QUANTITATIVE: CPT

## 2025-07-03 PROCEDURE — 85025 COMPLETE CBC W/AUTO DIFF WBC: CPT

## 2025-07-03 PROCEDURE — 84156 ASSAY OF PROTEIN URINE: CPT

## 2025-07-17 NOTE — PROGRESS NOTES
Chief Complaint  Amnesia    Patient or patient representative verbalized consent for the use of Ambient Listening during the visit with  Homar Man MD PhD for chart documentation. 7/25/2025  10:14 EDT    Subjective      History of Present Illness:  Debra Barnett is a delightful 70 y.o. right handed female who presents to [facility] NEUROLOGY [facility]OSURGERY referred for memory loss with history of:  Past Medical History:   Diagnosis Date    Anemia     Berman's esophagus with low grade dysplasia     Cardiac murmur     Corns and callus     DM (diabetes mellitus)     Fatty liver disease, nonalcoholic     GERD (gastroesophageal reflux disease)     HBP (high blood pressure)     Heartburn     Hepatitis A 1971    Hiatal hernia     Hyperlipidemia     Renal stone    VANESSA.  Son Edmund accompanied today.    History of Present Illness  The patient is a 70-year-old female referred to neurology for memory loss. She is accompanied by her son.    She reports experiencing memory and cognitive issues, which have been progressively worsening over the past 3 to 4 years. Her son corroborates this, noting a significant decline in her cognitive function over the last 6 to 8 months. A recent fall resulting in a head injury has exacerbated her symptoms, and she has been experiencing headaches since the fall.    She has been living in her current home for approximately 25 years, sharing the space with her two sons and grandson. She has four children in total, all of whom reside nearby. 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 She does not own a cell phone but can operate a TV remote and microwave. She has been misplacing items around the house for about a year. She rarely socializes but does not report any conversational difficulties when she does. However, family members and friends have noticed her repeating  herself during conversations. She also struggles with word-finding during conversations. She is unaware of any personality or mood changes over the past year, a statement her son agrees with.    She sleeps for at least 8 hours per night and has been napping during the day for the past 10 years. She has no history of stroke, seizure, or CNS infection like encephalitis or meningitis. She has not driven for several years due to dizziness. She has left the stove on unattended in the past.    She is capable of performing basic activities such as eating, drinking, dressing, toileting, showering, and bathing without supervision. However, she struggles with more complex tasks like managing household bills, finances, cooking, cleaning, and grocery shopping due to physical limitations. She uses a pill box to manage her medications and takes them as prescribed.    She has a history of depression and anxiety, for which she is currently on medication monitored by her primary care physician. She rates her stress levels as high at times.    SOCIAL HISTORY:    Education Level: Completed up to the 11th grade    Occupations: Sales and factory work    Alcohol: No current alcohol consumption    Tobacco: No current tobacco consumption    Sleep: Sleeps at least 8 hours per night, has been napping during the day for the past 10 years    FAMILY HISTORY  - Mother: Dementia before she     MEDICATIONS  CURRENT MEDS:  Zoloft  Metoprolol  Metformin  Clonidine  Amlodipine  Lisinopril  Meclizine  Melatonin          All available pertinent Labs and Imaging personally reviewed, including:    Imaging:    CT Head Without Contrast 2024 for hypertension.  Impression  No definite acute intracranial abnormality.  Small hyperdensity within the posterior aspect of the left lateral ventricle most likely represents a small choroid plexus. Less likely, this may also represent a small mass. Much less likely, this may also represent a small amount of  "intraventricular hemorrhage. If symptoms persist, please consider further evaluation with dedicated MRI of the brain with and without contrast          Labs:  Lab Results   Component Value Date    WBC 5.93 07/03/2025    HGB 12.4 07/03/2025    HCT 37.1 07/03/2025    MCV 92.3 07/03/2025     07/03/2025     Lab Results   Component Value Date    GLUCOSE 93 07/03/2025    BUN 23.0 07/03/2025    CREATININE 1.31 (H) 07/03/2025     (L) 07/03/2025    K 4.3 07/03/2025    CL 99 07/03/2025    CALCIUM 9.7 07/03/2025    PROTEINTOT 7.2 07/03/2025    ALBUMIN 4.4 07/03/2025    ALT 7 07/03/2025    AST 11 07/03/2025    ALKPHOS 35 (L) 07/03/2025    BILITOT 0.5 07/03/2025    GLOB 2.8 07/03/2025    AGRATIO 1.6 07/03/2025    BCR 17.6 07/03/2025    ANIONGAP 12.0 07/03/2025    EGFR 43.4 (L) 07/03/2025     No results found for: \"HGBA1C\"  No results found for: \"TSH\"  No results found for: \"BVVVPAKH61\"  No results found for: \"FOLATE\"  No results found for: \"CHOL\", \"CHLPL\", \"TRIG\", \"HDL\", \"LDL\", \"LDLDIRECT\"      Objective   Vital Signs:   /97   Pulse 75   Ht 154.9 cm (61\")   Wt 46 kg (101 lb 8 oz)   BMI 19.18 kg/m²     GENERAL:  GEN: well appearing, no apparent distress, appropriately dressed and groomed.  HEENT: NCAT, nml symm facies, no LNA, no goiter  LUNGS: CTA tobias, no wheezes/crackles/rhonchi noted  Card: RRR, no m noted, no carotid bruit, tobias rad and dp pulses 2+ with cap refill < 2 sec  EXT: no cce, no rash noted    NEUROLOGICAL:  MS: A+O x 3, language spontaneous, fluent with logical content, no word finding, no repeating or perseveration, reported own and regarded as excellent historian, normal judgement and insight, some psychomotor slowing with some anxiety also;   Son present and adds to and corroborates history.  CN: PERRLA, full excursions in all cardinal directions with smooth pursuits throughout with mild limited upgaze. Cover test reveals no phoria. Visual fields full to confrontation. V1-III Light touch " symm and intact; symm jaw clench masseter and temporalis bulk and tone, medial and lateral pterygoids intact. Symm forehead crease, Left face droop  with decreased eye blink (old Bell's). Hearing grossly symm and intact to finger rub. Uvula and soft palate midline rise on gag. Sternocleidomastoids and traps symm and 5/5. Tongue demonstrates no furrowing and extends midline and into left and right.  MOTOR: no atrophy/drift, mild gegenhalten paratone, strength 5/5, no adventitial movements or tremor noted  SENSORY: LT/PP/Vib intact, symm, and wnL for age. Romberg - NEG  COORD: CORDELL/FTN/HTS with good rhythm, speed, and amplitude. No ataxia noted.  GAIT: Native slow tentative mild wide based gait with mild decreased stride without stoop or shuffle, nml symm tobias armswing, nml start/stop/turn. Toe and heel walk without difficulty. Tandem walk some halfsteps to maintain balance  DTR's: 2+ throughout with 3+ patellas; no clonus, Babinski - Absent.         ASSESSMENT & PLAN:    70 y.o. female who presents to [facility] NEUROLOGY & NEUROSURGERY referred for memory loss.         Diagnoses and all orders for this visit:    1. MCI (mild cognitive impairment) with memory loss (Primary)         Assessment & Plan  1. Mild cognitive impairment.  Based on history and exam today, suspect patient has normal aging vs mild cognitive impairment with memory difficulty. Her cognitive screening test score is 28/30, indicating normal thinking. However, her overall performance suggests possible mild cognitive impairment with some memory issues. This could be attributed to modifiable risk factors such as medication effects, uncontrolled mood, anxiety, depression, poor sleep, and fatigue. Her tentative gait and vague dizziness may also contribute to her cognitive difficulties such as anxiety, however, direct exam testing today is unrevealing for neurological component. She does not meet the criteria for memory medication  initiation at this time. She was advised to maintain a healthy lifestyle and diet, ensure adequate sleep (approximately 8 hours), engage in daily exercise such as brisk walking for 45 minutes, and follow a balanced diet. The importance of a durable power of  for medical and financial decisions was also discussed (she has one). Laboratory tests and an MRI of the brain will be ordered for further evaluation.    2. Depression.  She has a history of depression and is currently on medication for it, monitored by her primary care provider.    3. Anxiety.  She has a history of anxiety.    4. Fall risk.  She has experienced falls and near-falls, likely due to physical limitations and anxiety. Her balance is good when stationary, but she tends to overcorrect, possibly due to anxiety from previous falls. No specific findings on exam explain her near falls and falls. She was advised to be cautious while walking, especially when turning, and to slow down to prevent falls. She was also advised to avoid cooking or using the stove unless under immediate supervision.          Follow Up  Return in about 4 months (around 11/25/2025) for MCI, lab and MRI review.    47 minutes were spent caring for Debra Barnett on this date of service. This time spent by me includes preparing for the visit, reviewing tests, obtaining/reviewing separately obtained history, performing medically appropriate exam/evaluation, counseling/educating the patient/family/caregiver, ordering medications/tests/procedures, referring/communicating with other health care professionals, documenting information in the medical record, independently interpreting results and communicating that with the patient/family/caregiver and/or care coordination.     Patient was given instructions and counseling regarding her condition or for health maintenance advice. Please see specific information pulled into the AVS if appropriate.

## 2025-07-23 ENCOUNTER — TRANSCRIBE ORDERS (OUTPATIENT)
Dept: ADMINISTRATIVE | Facility: HOSPITAL | Age: 70
End: 2025-07-23
Payer: MEDICARE

## 2025-07-23 DIAGNOSIS — Z78.0 ASYMPTOMATIC MENOPAUSAL STATE: Primary | ICD-10-CM

## 2025-07-25 ENCOUNTER — PATIENT ROUNDING (BHMG ONLY) (OUTPATIENT)
Dept: NEUROLOGY | Facility: CLINIC | Age: 70
End: 2025-07-25
Payer: MEDICARE

## 2025-07-25 ENCOUNTER — OFFICE VISIT (OUTPATIENT)
Dept: NEUROLOGY | Facility: CLINIC | Age: 70
End: 2025-07-25
Payer: MEDICARE

## 2025-07-25 VITALS
BODY MASS INDEX: 19.16 KG/M2 | HEART RATE: 75 BPM | SYSTOLIC BLOOD PRESSURE: 158 MMHG | HEIGHT: 61 IN | DIASTOLIC BLOOD PRESSURE: 97 MMHG | WEIGHT: 101.5 LBS

## 2025-07-25 DIAGNOSIS — G31.84 MCI (MILD COGNITIVE IMPAIRMENT) WITH MEMORY LOSS: Primary | ICD-10-CM

## 2025-07-25 RX ORDER — LISINOPRIL 10 MG/1
TABLET ORAL
COMMUNITY

## 2025-07-25 RX ORDER — MECLIZINE HYDROCHLORIDE 25 MG/1
TABLET ORAL EVERY 24 HOURS
COMMUNITY

## 2025-08-13 ENCOUNTER — LAB (OUTPATIENT)
Dept: LAB | Facility: HOSPITAL | Age: 70
End: 2025-08-13
Payer: MEDICARE

## 2025-08-13 ENCOUNTER — TRANSCRIBE ORDERS (OUTPATIENT)
Dept: ADMINISTRATIVE | Facility: HOSPITAL | Age: 70
End: 2025-08-13
Payer: MEDICARE

## 2025-08-13 DIAGNOSIS — G31.84 MCI (MILD COGNITIVE IMPAIRMENT) WITH MEMORY LOSS: ICD-10-CM

## 2025-08-13 DIAGNOSIS — N18.30 STAGE 3 CHRONIC KIDNEY DISEASE, UNSPECIFIED WHETHER STAGE 3A OR 3B CKD: ICD-10-CM

## 2025-08-13 DIAGNOSIS — I10 ESSENTIAL HYPERTENSION, MALIGNANT: Primary | ICD-10-CM

## 2025-08-13 DIAGNOSIS — I10 ESSENTIAL HYPERTENSION, MALIGNANT: ICD-10-CM

## 2025-08-13 LAB
ALBUMIN SERPL-MCNC: 4.8 G/DL (ref 3.5–5.2)
ALBUMIN UR-MCNC: 211.2 MG/DL
ALBUMIN/GLOB SERPL: 1.6 G/DL
ALP SERPL-CCNC: 42 U/L (ref 39–117)
ALT SERPL W P-5'-P-CCNC: 7 U/L (ref 1–33)
ANION GAP SERPL CALCULATED.3IONS-SCNC: 18.3 MMOL/L (ref 5–15)
AST SERPL-CCNC: 18 U/L (ref 1–32)
BACTERIA UR QL AUTO: NORMAL /HPF
BASOPHILS # BLD AUTO: 0.02 10*3/MM3 (ref 0–0.2)
BASOPHILS NFR BLD AUTO: 0.3 % (ref 0–1.5)
BILIRUB SERPL-MCNC: 0.6 MG/DL (ref 0–1.2)
BILIRUB UR QL STRIP: NEGATIVE
BUN SERPL-MCNC: 31 MG/DL (ref 8–23)
BUN/CREAT SERPL: 25.2 (ref 7–25)
CALCIUM SPEC-SCNC: 10.6 MG/DL (ref 8.6–10.5)
CHLORIDE SERPL-SCNC: 94 MMOL/L (ref 98–107)
CLARITY UR: CLEAR
CO2 SERPL-SCNC: 21.7 MMOL/L (ref 22–29)
COLOR UR: YELLOW
CREAT SERPL-MCNC: 1.23 MG/DL (ref 0.57–1)
CREAT UR-MCNC: 87.2 MG/DL
CREAT UR-MCNC: 98.6 MG/DL
DEPRECATED RDW RBC AUTO: 44.1 FL (ref 37–54)
EGFRCR SERPLBLD CKD-EPI 2021: 46.8 ML/MIN/1.73
EOSINOPHIL # BLD AUTO: 0.03 10*3/MM3 (ref 0–0.4)
EOSINOPHIL NFR BLD AUTO: 0.5 % (ref 0.3–6.2)
ERYTHROCYTE [DISTWIDTH] IN BLOOD BY AUTOMATED COUNT: 13 % (ref 12.3–15.4)
GLOBULIN UR ELPH-MCNC: 3 GM/DL
GLUCOSE SERPL-MCNC: 100 MG/DL (ref 65–99)
GLUCOSE UR STRIP-MCNC: NEGATIVE MG/DL
HCT VFR BLD AUTO: 44.3 % (ref 34–46.6)
HGB BLD-MCNC: 14.4 G/DL (ref 12–15.9)
HGB UR QL STRIP.AUTO: ABNORMAL
HYALINE CASTS UR QL AUTO: NORMAL /LPF
IMM GRANULOCYTES # BLD AUTO: 0.05 10*3/MM3 (ref 0–0.05)
IMM GRANULOCYTES NFR BLD AUTO: 0.8 % (ref 0–0.5)
KETONES UR QL STRIP: ABNORMAL
LEUKOCYTE ESTERASE UR QL STRIP.AUTO: NEGATIVE
LYMPHOCYTES # BLD AUTO: 1.23 10*3/MM3 (ref 0.7–3.1)
LYMPHOCYTES NFR BLD AUTO: 20.9 % (ref 19.6–45.3)
MCH RBC QN AUTO: 30.1 PG (ref 26.6–33)
MCHC RBC AUTO-ENTMCNC: 32.5 G/DL (ref 31.5–35.7)
MCV RBC AUTO: 92.7 FL (ref 79–97)
MICROALBUMIN/CREAT UR: 2422 MG/G (ref 0–29)
MONOCYTES # BLD AUTO: 0.37 10*3/MM3 (ref 0.1–0.9)
MONOCYTES NFR BLD AUTO: 6.3 % (ref 5–12)
NEUTROPHILS NFR BLD AUTO: 4.19 10*3/MM3 (ref 1.7–7)
NEUTROPHILS NFR BLD AUTO: 71.2 % (ref 42.7–76)
NITRITE UR QL STRIP: NEGATIVE
NRBC BLD AUTO-RTO: 0 /100 WBC (ref 0–0.2)
PH UR STRIP.AUTO: 6.5 [PH] (ref 5–8)
PLATELET # BLD AUTO: 310 10*3/MM3 (ref 140–450)
PMV BLD AUTO: 9.8 FL (ref 6–12)
POTASSIUM SERPL-SCNC: 4.4 MMOL/L (ref 3.5–5.2)
PROT ?TM UR-MCNC: 319.8 MG/DL
PROT SERPL-MCNC: 7.8 G/DL (ref 6–8.5)
PROT UR QL STRIP: ABNORMAL
PROT/CREAT UR: 3.24 MG/G{CREAT}
RBC # BLD AUTO: 4.78 10*6/MM3 (ref 3.77–5.28)
RBC # UR STRIP: NORMAL /HPF
REF LAB TEST METHOD: NORMAL
SODIUM SERPL-SCNC: 134 MMOL/L (ref 136–145)
SP GR UR STRIP: 1.02 (ref 1–1.03)
SQUAMOUS #/AREA URNS HPF: NORMAL /HPF
TSH SERPL DL<=0.05 MIU/L-ACNC: 1.47 UIU/ML (ref 0.27–4.2)
UROBILINOGEN UR QL STRIP: ABNORMAL
VIT B12 BLD-MCNC: 540 PG/ML (ref 211–946)
WBC # UR STRIP: NORMAL /HPF
WBC NRBC COR # BLD AUTO: 5.89 10*3/MM3 (ref 3.4–10.8)

## 2025-08-13 PROCEDURE — 36415 COLL VENOUS BLD VENIPUNCTURE: CPT

## 2025-08-13 PROCEDURE — 84156 ASSAY OF PROTEIN URINE: CPT

## 2025-08-13 PROCEDURE — 85025 COMPLETE CBC W/AUTO DIFF WBC: CPT

## 2025-08-13 PROCEDURE — 83520 IMMUNOASSAY QUANT NOS NONAB: CPT

## 2025-08-13 PROCEDURE — 80053 COMPREHEN METABOLIC PANEL: CPT

## 2025-08-13 PROCEDURE — 82607 VITAMIN B-12: CPT

## 2025-08-13 PROCEDURE — 82570 ASSAY OF URINE CREATININE: CPT

## 2025-08-13 PROCEDURE — 82043 UR ALBUMIN QUANTITATIVE: CPT

## 2025-08-13 PROCEDURE — 86592 SYPHILIS TEST NON-TREP QUAL: CPT

## 2025-08-13 PROCEDURE — 84443 ASSAY THYROID STIM HORMONE: CPT

## 2025-08-13 PROCEDURE — 81001 URINALYSIS AUTO W/SCOPE: CPT

## 2025-08-13 PROCEDURE — 83921 ORGANIC ACID SINGLE QUANT: CPT

## 2025-08-14 LAB — RPR SER QL: NORMAL

## 2025-08-16 LAB
A -- BETA-AMYLOID 42/40 RATIO: 0.09
AL BETA40 PLAS-MCNC: 294.8 PG/ML
AL BETA42 PLAS-MCNC: 25.53 PG/ML
ATN SUMMARY1: ABNORMAL
INFORMATION:: ABNORMAL
NFL CHAIN SERPL IA-MCNC: 18.9 PG/ML (ref 0–6.04)
P-TAU181 PLAS IA-MCNC: 2.84 PG/ML (ref 0–0.97)

## 2025-08-21 DIAGNOSIS — R80.9 NEPHROTIC RANGE PROTEINURIA: Primary | ICD-10-CM

## 2025-08-22 LAB — METHYLMALONATE SERPL-SCNC: 282 NMOL/L (ref 0–378)

## 2025-08-28 ENCOUNTER — TRANSCRIBE ORDERS (OUTPATIENT)
Dept: LAB | Facility: HOSPITAL | Age: 70
End: 2025-08-28
Payer: MEDICARE

## 2025-08-28 ENCOUNTER — LAB (OUTPATIENT)
Dept: LAB | Facility: HOSPITAL | Age: 70
End: 2025-08-28
Payer: MEDICARE

## 2025-08-28 DIAGNOSIS — D64.9 ANEMIA, UNSPECIFIED TYPE: ICD-10-CM

## 2025-08-28 DIAGNOSIS — N18.4 CHRONIC RENAL DISEASE, STAGE IV: Primary | ICD-10-CM

## 2025-08-28 DIAGNOSIS — E55.9 VITAMIN D DEFICIENCY: ICD-10-CM

## 2025-08-28 DIAGNOSIS — R80.8 OTHER PROTEINURIA: ICD-10-CM

## 2025-08-28 DIAGNOSIS — N18.4 CHRONIC RENAL DISEASE, STAGE IV: ICD-10-CM

## 2025-08-28 DIAGNOSIS — I10 HYPERTENSION, ESSENTIAL: ICD-10-CM

## 2025-08-28 LAB
25(OH)D3 SERPL-MCNC: 46.1 NG/ML (ref 30–100)
ALBUMIN SERPL-MCNC: 4.5 G/DL (ref 3.5–5.2)
ALBUMIN UR-MCNC: 48.4 MG/DL
ALBUMIN/GLOB SERPL: 1.4 G/DL
ALP SERPL-CCNC: 36 U/L (ref 39–117)
ALT SERPL W P-5'-P-CCNC: 8 U/L (ref 1–33)
ANION GAP SERPL CALCULATED.3IONS-SCNC: 13.3 MMOL/L (ref 5–15)
AST SERPL-CCNC: 15 U/L (ref 1–32)
BACTERIA UR QL AUTO: NORMAL /HPF
BASOPHILS # BLD AUTO: 0.03 10*3/MM3 (ref 0–0.2)
BASOPHILS NFR BLD AUTO: 0.6 % (ref 0–1.5)
BILIRUB SERPL-MCNC: 0.4 MG/DL (ref 0–1.2)
BILIRUB UR QL STRIP: NEGATIVE
BUN SERPL-MCNC: 31 MG/DL (ref 8–23)
BUN/CREAT SERPL: 20.1 (ref 7–25)
C3 SERPL-MCNC: 130 MG/DL (ref 82–167)
C4 SERPL-MCNC: 30 MG/DL (ref 14–44)
CALCIUM SPEC-SCNC: 10.1 MG/DL (ref 8.6–10.5)
CHLORIDE SERPL-SCNC: 96 MMOL/L (ref 98–107)
CLARITY UR: CLEAR
CO2 SERPL-SCNC: 24.7 MMOL/L (ref 22–29)
COLOR UR: YELLOW
CREAT SERPL-MCNC: 1.54 MG/DL (ref 0.57–1)
CREAT UR-MCNC: 137.3 MG/DL
CREAT UR-MCNC: 155.9 MG/DL
DEPRECATED RDW RBC AUTO: 40.6 FL (ref 37–54)
EGFRCR SERPLBLD CKD-EPI 2021: 35.7 ML/MIN/1.73
EOSINOPHIL # BLD AUTO: 0.06 10*3/MM3 (ref 0–0.4)
EOSINOPHIL NFR BLD AUTO: 1.2 % (ref 0.3–6.2)
ERYTHROCYTE [DISTWIDTH] IN BLOOD BY AUTOMATED COUNT: 12.4 % (ref 12.3–15.4)
GLOBULIN UR ELPH-MCNC: 3.2 GM/DL
GLUCOSE SERPL-MCNC: 125 MG/DL (ref 65–99)
GLUCOSE UR STRIP-MCNC: NEGATIVE MG/DL
HCT VFR BLD AUTO: 38.9 % (ref 34–46.6)
HGB BLD-MCNC: 12.7 G/DL (ref 12–15.9)
HGB UR QL STRIP.AUTO: NEGATIVE
HYALINE CASTS UR QL AUTO: NORMAL /LPF
IMM GRANULOCYTES # BLD AUTO: 0.01 10*3/MM3 (ref 0–0.05)
IMM GRANULOCYTES NFR BLD AUTO: 0.2 % (ref 0–0.5)
KETONES UR QL STRIP: NEGATIVE
LEUKOCYTE ESTERASE UR QL STRIP.AUTO: NEGATIVE
LYMPHOCYTES # BLD AUTO: 1.48 10*3/MM3 (ref 0.7–3.1)
LYMPHOCYTES NFR BLD AUTO: 28.6 % (ref 19.6–45.3)
MCH RBC QN AUTO: 29.3 PG (ref 26.6–33)
MCHC RBC AUTO-ENTMCNC: 32.6 G/DL (ref 31.5–35.7)
MCV RBC AUTO: 89.8 FL (ref 79–97)
MICROALBUMIN/CREAT UR: 352.5 MG/G (ref 0–29)
MONOCYTES # BLD AUTO: 0.48 10*3/MM3 (ref 0.1–0.9)
MONOCYTES NFR BLD AUTO: 9.3 % (ref 5–12)
NEUTROPHILS NFR BLD AUTO: 3.11 10*3/MM3 (ref 1.7–7)
NEUTROPHILS NFR BLD AUTO: 60.1 % (ref 42.7–76)
NITRITE UR QL STRIP: NEGATIVE
NRBC BLD AUTO-RTO: 0 /100 WBC (ref 0–0.2)
PH UR STRIP.AUTO: 6 [PH] (ref 5–8)
PLATELET # BLD AUTO: 307 10*3/MM3 (ref 140–450)
PMV BLD AUTO: 9.5 FL (ref 6–12)
POTASSIUM SERPL-SCNC: 4.2 MMOL/L (ref 3.5–5.2)
PROT ?TM UR-MCNC: 84.5 MG/DL
PROT SERPL-MCNC: 7.7 G/DL (ref 6–8.5)
PROT UR QL STRIP: ABNORMAL
PROT/CREAT UR: 0.54 MG/G{CREAT}
RBC # BLD AUTO: 4.33 10*6/MM3 (ref 3.77–5.28)
RBC # UR STRIP: NORMAL /HPF
REF LAB TEST METHOD: NORMAL
SODIUM SERPL-SCNC: 134 MMOL/L (ref 136–145)
SP GR UR STRIP: 1.02 (ref 1–1.03)
SQUAMOUS #/AREA URNS HPF: NORMAL /HPF
UROBILINOGEN UR QL STRIP: ABNORMAL
WBC # UR STRIP: NORMAL /HPF
WBC NRBC COR # BLD AUTO: 5.17 10*3/MM3 (ref 3.4–10.8)

## 2025-08-28 PROCEDURE — 82570 ASSAY OF URINE CREATININE: CPT

## 2025-08-28 PROCEDURE — 81001 URINALYSIS AUTO W/SCOPE: CPT

## 2025-08-28 PROCEDURE — 82784 ASSAY IGA/IGD/IGG/IGM EACH: CPT

## 2025-08-28 PROCEDURE — 84156 ASSAY OF PROTEIN URINE: CPT

## 2025-08-28 PROCEDURE — 86160 COMPLEMENT ANTIGEN: CPT

## 2025-08-28 PROCEDURE — 80053 COMPREHEN METABOLIC PANEL: CPT

## 2025-08-28 PROCEDURE — 83516 IMMUNOASSAY NONANTIBODY: CPT

## 2025-08-28 PROCEDURE — 36415 COLL VENOUS BLD VENIPUNCTURE: CPT

## 2025-08-28 PROCEDURE — 82043 UR ALBUMIN QUANTITATIVE: CPT

## 2025-08-28 PROCEDURE — 86225 DNA ANTIBODY NATIVE: CPT

## 2025-08-28 PROCEDURE — 82306 VITAMIN D 25 HYDROXY: CPT

## 2025-08-28 PROCEDURE — 85025 COMPLETE CBC W/AUTO DIFF WBC: CPT

## 2025-08-28 PROCEDURE — 86334 IMMUNOFIX E-PHORESIS SERUM: CPT

## 2025-08-28 PROCEDURE — 83521 IG LIGHT CHAINS FREE EACH: CPT

## 2025-08-28 PROCEDURE — 84165 PROTEIN E-PHORESIS SERUM: CPT
